# Patient Record
Sex: FEMALE | Race: WHITE | URBAN - METROPOLITAN AREA
[De-identification: names, ages, dates, MRNs, and addresses within clinical notes are randomized per-mention and may not be internally consistent; named-entity substitution may affect disease eponyms.]

---

## 2022-08-15 ENCOUNTER — OFFICE VISIT (OUTPATIENT)
Dept: PRIMARY CARE CLINIC | Age: 72
End: 2022-08-15
Payer: MEDICARE

## 2022-08-15 VITALS
OXYGEN SATURATION: 98 % | BODY MASS INDEX: 23.13 KG/M2 | DIASTOLIC BLOOD PRESSURE: 84 MMHG | RESPIRATION RATE: 16 BRPM | HEIGHT: 65 IN | WEIGHT: 138.8 LBS | SYSTOLIC BLOOD PRESSURE: 136 MMHG | HEART RATE: 93 BPM

## 2022-08-15 DIAGNOSIS — Z79.4 TYPE 2 DIABETES MELLITUS WITHOUT COMPLICATION, WITH LONG-TERM CURRENT USE OF INSULIN (HCC): Primary | ICD-10-CM

## 2022-08-15 DIAGNOSIS — E11.9 TYPE 2 DIABETES MELLITUS WITHOUT COMPLICATION, WITH LONG-TERM CURRENT USE OF INSULIN (HCC): Primary | ICD-10-CM

## 2022-08-15 DIAGNOSIS — R53.82 CHRONIC FATIGUE: ICD-10-CM

## 2022-08-15 DIAGNOSIS — E03.9 HYPOTHYROIDISM, UNSPECIFIED TYPE: ICD-10-CM

## 2022-08-15 DIAGNOSIS — E78.2 MIXED HYPERLIPIDEMIA: ICD-10-CM

## 2022-08-15 PROCEDURE — G8427 DOCREV CUR MEDS BY ELIG CLIN: HCPCS | Performed by: STUDENT IN AN ORGANIZED HEALTH CARE EDUCATION/TRAINING PROGRAM

## 2022-08-15 PROCEDURE — G8420 CALC BMI NORM PARAMETERS: HCPCS | Performed by: STUDENT IN AN ORGANIZED HEALTH CARE EDUCATION/TRAINING PROGRAM

## 2022-08-15 PROCEDURE — 99203 OFFICE O/P NEW LOW 30 MIN: CPT | Performed by: STUDENT IN AN ORGANIZED HEALTH CARE EDUCATION/TRAINING PROGRAM

## 2022-08-15 PROCEDURE — 1123F ACP DISCUSS/DSCN MKR DOCD: CPT | Performed by: STUDENT IN AN ORGANIZED HEALTH CARE EDUCATION/TRAINING PROGRAM

## 2022-08-15 PROCEDURE — 1036F TOBACCO NON-USER: CPT | Performed by: STUDENT IN AN ORGANIZED HEALTH CARE EDUCATION/TRAINING PROGRAM

## 2022-08-15 PROCEDURE — 2022F DILAT RTA XM EVC RTNOPTHY: CPT | Performed by: STUDENT IN AN ORGANIZED HEALTH CARE EDUCATION/TRAINING PROGRAM

## 2022-08-15 PROCEDURE — G8400 PT W/DXA NO RESULTS DOC: HCPCS | Performed by: STUDENT IN AN ORGANIZED HEALTH CARE EDUCATION/TRAINING PROGRAM

## 2022-08-15 PROCEDURE — 1090F PRES/ABSN URINE INCON ASSESS: CPT | Performed by: STUDENT IN AN ORGANIZED HEALTH CARE EDUCATION/TRAINING PROGRAM

## 2022-08-15 PROCEDURE — 3046F HEMOGLOBIN A1C LEVEL >9.0%: CPT | Performed by: STUDENT IN AN ORGANIZED HEALTH CARE EDUCATION/TRAINING PROGRAM

## 2022-08-15 PROCEDURE — 3017F COLORECTAL CA SCREEN DOC REV: CPT | Performed by: STUDENT IN AN ORGANIZED HEALTH CARE EDUCATION/TRAINING PROGRAM

## 2022-08-15 RX ORDER — INSULIN LISPRO 100 [IU]/ML
4 INJECTION, SOLUTION INTRAVENOUS; SUBCUTANEOUS 2 TIMES DAILY
COMMUNITY
Start: 2021-11-29

## 2022-08-15 RX ORDER — ASPIRIN 81 MG/1
81 TABLET ORAL DAILY
COMMUNITY

## 2022-08-15 RX ORDER — LANCETS
EACH MISCELLANEOUS
COMMUNITY
Start: 2022-08-02

## 2022-08-15 RX ORDER — LEVOTHYROXINE SODIUM 0.1 MG/1
TABLET ORAL
COMMUNITY
Start: 2021-08-26

## 2022-08-15 RX ORDER — PEN NEEDLE, DIABETIC 31 GX5/16"
NEEDLE, DISPOSABLE MISCELLANEOUS
COMMUNITY
Start: 2022-08-01

## 2022-08-15 RX ORDER — INSULIN DETEMIR 100 [IU]/ML
INJECTION, SOLUTION SUBCUTANEOUS
COMMUNITY
Start: 2022-02-07

## 2022-08-15 RX ORDER — ATORVASTATIN CALCIUM 20 MG/1
10 TABLET, FILM COATED ORAL DAILY
COMMUNITY
Start: 2021-10-19

## 2022-08-15 SDOH — ECONOMIC STABILITY: FOOD INSECURITY: WITHIN THE PAST 12 MONTHS, THE FOOD YOU BOUGHT JUST DIDN'T LAST AND YOU DIDN'T HAVE MONEY TO GET MORE.: NEVER TRUE

## 2022-08-15 SDOH — ECONOMIC STABILITY: FOOD INSECURITY: WITHIN THE PAST 12 MONTHS, YOU WORRIED THAT YOUR FOOD WOULD RUN OUT BEFORE YOU GOT MONEY TO BUY MORE.: NEVER TRUE

## 2022-08-15 ASSESSMENT — PATIENT HEALTH QUESTIONNAIRE - PHQ9
SUM OF ALL RESPONSES TO PHQ QUESTIONS 1-9: 0
1. LITTLE INTEREST OR PLEASURE IN DOING THINGS: 0
SUM OF ALL RESPONSES TO PHQ QUESTIONS 1-9: 0
SUM OF ALL RESPONSES TO PHQ QUESTIONS 1-9: 0
SUM OF ALL RESPONSES TO PHQ9 QUESTIONS 1 & 2: 0
2. FEELING DOWN, DEPRESSED OR HOPELESS: 0
SUM OF ALL RESPONSES TO PHQ QUESTIONS 1-9: 0

## 2022-08-15 ASSESSMENT — ENCOUNTER SYMPTOMS
COUGH: 0
ABDOMINAL PAIN: 0

## 2022-08-15 ASSESSMENT — SOCIAL DETERMINANTS OF HEALTH (SDOH): HOW HARD IS IT FOR YOU TO PAY FOR THE VERY BASICS LIKE FOOD, HOUSING, MEDICAL CARE, AND HEATING?: NOT HARD AT ALL

## 2022-08-15 NOTE — PROGRESS NOTES
[facility] PRIMARY CARE  [facility] [address]  [address] [address]  Dept: [phone]  Dept Fax: [phone]    Juliano Mitchell is a 81 y.o. female who presents today for her medical conditions/complaints as noted below. Chief Complaint   Patient presents with    [facility]    Health Maintenance     Completed in [place]        HPI:     81 y. o. F presented to office to [facility]. She denied any current concerns. She has history of diabetes mellitus type 2. Last HbA1c in March was 7.3 per patient. Requested per his records. Practices healthy lifestyle. Checks fasting blood sugar which is usually less than 100 per patient. Patient mentioned that if it is below 100 she started feeling jittery. Take Synthroid for hypothyroidism. No other current medical issues. No other current concerns per  Vital signs stable. Patient had mammogram in March this year which was normal.  She had colonoscopy done in 2016 in [place] which was normal per patient. Requested previous records. She also had DEXA scan done by previous provider which was normal per patient. She refuses to take vitamin D and calcium supplements as she gets stomach problems from that. Advised lifestyle changes. Medications reviewed and refilled as appropriate, problem list updated. All concerns discussed in details and all questions answered to patient satisfaction. No results found for: LABA1C          ( goal A1C is < 7)   No results found for: LABMICR  No results found for: LDLCHOLESTEROL, LDLCALC    (goal LDL is <100)   No results found for: AST, ALT, BUN, CR  BP Readings from Last 3 Encounters:   08/15/22 136/84          (goal 120/80)    Past Medical History:   Diagnosis Date    High cholesterol     Type 2 diabetes mellitus without complication ([facility] [address]       History reviewed. No pertinent surgical history.     Family History   Problem Relation Age of Onset Cancer Mother     Diabetes Maternal Grandmother        Social History     Tobacco Use    Smoking status: Never    Smokeless tobacco: Never   Substance Use Topics    Alcohol use: Yes     Comment: occasional      Current Outpatient Medications   Medication Sig Dispense Refill    atorvastatin (LIPITOR) 20 MG tablet Take 10 mg by mouth in the morning. insulin detemir (LEVEMIR FLEXTOUCH) 100 UNIT/ML injection pen INJECT 11 UNITS  SUBCUTANEOUSLY AT NIGHT      insulin lispro, 1 Unit Dial, 100 UNIT/ML SOPN Inject 4 Units into the skin in the morning and 4 Units before bedtime. levothyroxine (SYNTHROID) 100 MCG tablet TAKE 1 TABLET BY MOUTH  DAILY      B-D UF III MINI PEN NEEDLES 31G X 5 MM MISC USE TO INJECT INSULIN 3 TIMES A DAY AS DIRECTED      Accu-Chek FastClix Lancets MISC TEST 3 (THREE) TIMES A DAY. aspirin 81 MG EC tablet Take 81 mg by mouth in the morning. No current facility-administered medications for this visit. No Known Allergies    Health Maintenance   Topic Date Due    Annual Wellness Visit (AWV)  Never done    Lipids  Never done    DTaP/Tdap/Td vaccine (1 - Tdap) Never done    DEXA (modify frequency per FRAX score)  Never done    Pneumococcal 65+ years Vaccine (1 - PCV) Never done    Shingles vaccine (2 of 2) 01/29/2021    COVID-19 Vaccine (3 - Booster for Jae series) 05/10/2022    Hepatitis C screen  08/15/2023 (Originally 4/3/1968)    Flu vaccine (1) 09/01/2022    Breast cancer screen  02/28/2023    Depression Screen  08/15/2023    Colorectal Cancer Screen  08/10/2026    Hepatitis A vaccine  Aged Out    Hepatitis B vaccine  Aged Out    Hib vaccine  Aged Out    Meningococcal (ACWY) vaccine  Aged Out       Subjective:      Review of Systems   Constitutional:  Negative for fatigue and fever. HENT:  Negative for congestion. Respiratory:  Negative for cough. Cardiovascular:  Negative for chest pain and palpitations. Gastrointestinal:  Negative for abdominal pain. Genitourinary:  Negative for flank pain, frequency and hematuria. Musculoskeletal:  Negative for arthralgias. Skin:  Negative for rash. Allergic/Immunologic: Negative for environmental allergies. Neurological:  Negative for dizziness, speech difficulty and light-headedness. Hematological:  Negative for adenopathy. Psychiatric/Behavioral:  Negative for behavioral problems. The patient is not nervous/anxious. Objective:     Physical Exam  Constitutional:       General: She is not in acute distress. Appearance: Normal appearance. HENT:      Head: Normocephalic and atraumatic. Nose: No congestion. Cardiovascular:      Rate and Rhythm: Normal rate and regular rhythm. Pulses: Normal pulses. Heart sounds: Normal heart sounds. No murmur heard. Pulmonary:      Effort: Pulmonary effort is normal. No respiratory distress. Breath sounds: Normal breath sounds. No wheezing. Abdominal:      General: Bowel sounds are normal.      Palpations: Abdomen is soft. Tenderness: There is no abdominal tenderness. Musculoskeletal:         General: No swelling or tenderness. Normal range of motion. Cervical back: Normal range of motion and neck supple. No tenderness. Skin:     Coloration: Skin is not jaundiced. Findings: No bruising. Neurological:      General: No focal deficit present. Mental Status: She is alert and oriented to person, place, and time. Mental status is at baseline. Cranial Nerves: No cranial nerve deficit. Psychiatric:         Mood and Affect: Mood normal.         Behavior: Behavior normal.         Thought Content: Thought content normal.     /84   Pulse 93   Resp 16   Ht 5' 4.5\" (1.638 m)   Wt 138 lb 12.8 oz (63 kg)   SpO2 98%   BMI 23.46 kg/m²     Assessment:        Julia Dubois was seen today for establish care and health maintenance.     Diagnoses and all orders for this visit:    Type 2 diabetes mellitus without complication, with long-term current use of insulin (HCC)  -     Hemoglobin A1C; Future  Continued on the insulin. Chronic fatigue  -     Vitamin B12 & Folate; Future    Hypothyroidism, unspecified type  -     TSH With Reflex Ft4; Future  Continued on Synthroid. Mixed hyperlipidemia  Continued on Lipitor           Plan:      Return in about 3 months (around 11/15/2022). Orders Placed This Encounter   Procedures    Hemoglobin A1C     Standing Status:   Future     Standing Expiration Date:   2/15/2024    TSH With Reflex Ft4     Standing Status:   Future     Standing Expiration Date:   8/15/2023    Vitamin B12 & Folate     Standing Status:   Future     Standing Expiration Date:   8/15/2023     No orders of the defined types were placed in this encounter. Patient given educational materials - see patient instructions. Discussed use, benefit, and side effects of prescribedmedications. All patient questions answered. Pt voiced understanding. Reviewed health maintenance. Instructed to continue current medications, diet and exercise. Patient agreed with treatment plan. Follow up as directed. I spent a total of 30-44 minutes face to face with this patient. Over 50% of that time was spent on counseling and care coordination. Please see assessment and plan for details. Electronically signed by   Sarah aJcinto MD on 8/15/2022 at 9:27 AM  [facility]. Please note that this chart was generated using voice recognition Dragon dictation software. Although every effort was made to ensure the accuracy of this automatedtranscription, some errors in transcription may have occurred.

## 2022-08-17 ENCOUNTER — HOSPITAL ENCOUNTER (OUTPATIENT)
Age: 72
Setting detail: SPECIMEN
Discharge: HOME | End: 2022-08-17

## 2022-08-17 DIAGNOSIS — R53.82 CHRONIC FATIGUE: ICD-10-CM

## 2022-08-17 DIAGNOSIS — Z79.4 TYPE 2 DIABETES MELLITUS WITHOUT COMPLICATION, WITH LONG-TERM CURRENT USE OF INSULIN (HCC): ICD-10-CM

## 2022-08-17 DIAGNOSIS — E03.9 HYPOTHYROIDISM, UNSPECIFIED TYPE: ICD-10-CM

## 2022-08-17 DIAGNOSIS — E11.9 TYPE 2 DIABETES MELLITUS WITHOUT COMPLICATION, WITH LONG-TERM CURRENT USE OF INSULIN (HCC): ICD-10-CM

## 2022-08-17 LAB
ESTIMATED AVERAGE GLUCOSE: 163 MG/DL
FOLATE: 18.3 NG/ML
HBA1C MFR BLD: 7.3 % (ref 4–6)
TSH SERPL DL<=0.05 MIU/L-ACNC: 1.28 UIU/ML (ref 0.3–5)
VITAMIN B-12: 426 PG/ML (ref 232–1245)

## 2022-08-22 ENCOUNTER — TELEPHONE (OUTPATIENT)
Dept: FAMILY MEDICINE CLINIC | Age: 72
End: 2022-08-22

## 2022-10-21 NOTE — TELEPHONE ENCOUNTER
Patient calling in office for new prescriptions. Pharmacy states that PCP needs to send in new scripts since old scripts where from historical provider.  I have the medication pended with how patient states she takes the medication    Last Visit Date: 8/15/2022   Next Visit Date: 11/16/2022

## 2022-10-23 RX ORDER — ATORVASTATIN CALCIUM 20 MG/1
20 TABLET, FILM COATED ORAL DAILY
Qty: 90 TABLET | Refills: 0 | Status: SHIPPED | OUTPATIENT
Start: 2022-10-23 | End: 2023-01-21

## 2022-10-23 RX ORDER — LEVOTHYROXINE SODIUM 0.1 MG/1
100 TABLET ORAL DAILY
Qty: 90 TABLET | Refills: 3 | Status: SHIPPED | OUTPATIENT
Start: 2022-10-23

## 2022-10-31 NOTE — TELEPHONE ENCOUNTER
Pt called in today, she called in a medication refill last week. Medications were sent in to [facility] but pt states she received a email that the refills were cancelled by them due to not hearing back from the physicians office about a question on the medication. Did MA received any faxs from [facility]? Pt was advised the office would call to correct this for patient. Please return call to pt once this is corrected.

## 2022-10-31 NOTE — TELEPHONE ENCOUNTER
Called and spoke to pharmacist at [facility]. Gave clarification on script. Called and informed patient.

## 2022-11-16 ENCOUNTER — OFFICE VISIT (OUTPATIENT)
Dept: PRIMARY CARE CLINIC | Age: 72
End: 2022-11-16
Payer: MEDICARE

## 2022-11-16 VITALS
DIASTOLIC BLOOD PRESSURE: 70 MMHG | OXYGEN SATURATION: 98 % | HEART RATE: 78 BPM | HEIGHT: 64 IN | BODY MASS INDEX: 23.66 KG/M2 | SYSTOLIC BLOOD PRESSURE: 120 MMHG | WEIGHT: 138.6 LBS

## 2022-11-16 DIAGNOSIS — H61.22 IMPACTED CERUMEN OF LEFT EAR: ICD-10-CM

## 2022-11-16 DIAGNOSIS — Z79.4 TYPE 2 DIABETES MELLITUS WITHOUT COMPLICATION, WITH LONG-TERM CURRENT USE OF INSULIN (HCC): ICD-10-CM

## 2022-11-16 DIAGNOSIS — E11.9 TYPE 2 DIABETES MELLITUS WITHOUT COMPLICATION, WITH LONG-TERM CURRENT USE OF INSULIN (HCC): ICD-10-CM

## 2022-11-16 DIAGNOSIS — Z00.00 WELCOME TO MEDICARE PREVENTIVE VISIT: Primary | ICD-10-CM

## 2022-11-16 LAB — HBA1C MFR BLD: 6.9 %

## 2022-11-16 PROCEDURE — 3017F COLORECTAL CA SCREEN DOC REV: CPT | Performed by: STUDENT IN AN ORGANIZED HEALTH CARE EDUCATION/TRAINING PROGRAM

## 2022-11-16 PROCEDURE — 83036 HEMOGLOBIN GLYCOSYLATED A1C: CPT | Performed by: STUDENT IN AN ORGANIZED HEALTH CARE EDUCATION/TRAINING PROGRAM

## 2022-11-16 PROCEDURE — 3044F HG A1C LEVEL LT 7.0%: CPT | Performed by: STUDENT IN AN ORGANIZED HEALTH CARE EDUCATION/TRAINING PROGRAM

## 2022-11-16 PROCEDURE — 1123F ACP DISCUSS/DSCN MKR DOCD: CPT | Performed by: STUDENT IN AN ORGANIZED HEALTH CARE EDUCATION/TRAINING PROGRAM

## 2022-11-16 PROCEDURE — G0402 INITIAL PREVENTIVE EXAM: HCPCS | Performed by: STUDENT IN AN ORGANIZED HEALTH CARE EDUCATION/TRAINING PROGRAM

## 2022-11-16 RX ORDER — INSULIN DETEMIR 100 [IU]/ML
5 INJECTION, SOLUTION SUBCUTANEOUS NIGHTLY
Qty: 5 ADJUSTABLE DOSE PRE-FILLED PEN SYRINGE | Refills: 0 | Status: SHIPPED | OUTPATIENT
Start: 2022-11-16

## 2022-11-16 RX ORDER — INSULIN DETEMIR 100 [IU]/ML
5 INJECTION, SOLUTION SUBCUTANEOUS NIGHTLY
Qty: 5 ADJUSTABLE DOSE PRE-FILLED PEN SYRINGE | Refills: 0 | Status: CANCELLED | OUTPATIENT
Start: 2022-11-16

## 2022-11-16 RX ORDER — INSULIN DETEMIR 100 [IU]/ML
5 INJECTION, SOLUTION SUBCUTANEOUS NIGHTLY
Qty: 5 ADJUSTABLE DOSE PRE-FILLED PEN SYRINGE | Refills: 0 | Status: SHIPPED | OUTPATIENT
Start: 2022-11-16 | End: 2022-11-16

## 2022-11-16 ASSESSMENT — LIFESTYLE VARIABLES
HOW OFTEN DURING THE LAST YEAR HAVE YOU NEEDED AN ALCOHOLIC DRINK FIRST THING IN THE MORNING TO GET YOURSELF GOING AFTER A NIGHT OF HEAVY DRINKING: 0
HOW MANY STANDARD DRINKS CONTAINING ALCOHOL DO YOU HAVE ON A TYPICAL DAY: 1 OR 2
HOW OFTEN DURING THE LAST YEAR HAVE YOU FOUND THAT YOU WERE NOT ABLE TO STOP DRINKING ONCE YOU HAD STARTED: 0
HAVE YOU OR SOMEONE ELSE BEEN INJURED AS A RESULT OF YOUR DRINKING: 0
HOW OFTEN DURING THE LAST YEAR HAVE YOU HAD A FEELING OF GUILT OR REMORSE AFTER DRINKING: 0
HOW OFTEN DO YOU HAVE A DRINK CONTAINING ALCOHOL: 4 OR MORE TIMES A WEEK
HAS A RELATIVE, FRIEND, DOCTOR, OR ANOTHER HEALTH PROFESSIONAL EXPRESSED CONCERN ABOUT YOUR DRINKING OR SUGGESTED YOU CUT DOWN: 0
HOW OFTEN DURING THE LAST YEAR HAVE YOU BEEN UNABLE TO REMEMBER WHAT HAPPENED THE NIGHT BEFORE BECAUSE YOU HAD BEEN DRINKING: 0
HOW OFTEN DURING THE LAST YEAR HAVE YOU FAILED TO DO WHAT WAS NORMALLY EXPECTED FROM YOU BECAUSE OF DRINKING: 0

## 2022-11-16 ASSESSMENT — PATIENT HEALTH QUESTIONNAIRE - PHQ9
SUM OF ALL RESPONSES TO PHQ QUESTIONS 1-9: 0
SUM OF ALL RESPONSES TO PHQ9 QUESTIONS 1 & 2: 0
SUM OF ALL RESPONSES TO PHQ QUESTIONS 1-9: 0
SUM OF ALL RESPONSES TO PHQ QUESTIONS 1-9: 0
1. LITTLE INTEREST OR PLEASURE IN DOING THINGS: 0
2. FEELING DOWN, DEPRESSED OR HOPELESS: 0
SUM OF ALL RESPONSES TO PHQ QUESTIONS 1-9: 0

## 2022-11-16 NOTE — PATIENT INSTRUCTIONS
Personalized Preventive Plan for Yumiko Lamas - 11/16/2022  Medicare offers a range of preventive health benefits. Some of the tests and screenings are paid in full while other may be subject to a deductible, co-insurance, and/or copay. Some of these benefits include a comprehensive review of your medical history including lifestyle, illnesses that may run in your family, and various assessments and screenings as appropriate. After reviewing your medical record and screening and assessments performed today your provider may have ordered immunizations, labs, imaging, and/or referrals for you. A list of these orders (if applicable) as well as your Preventive Care list are included within your After Visit Summary for your review. Other Preventive Recommendations:    A preventive eye exam performed by an eye specialist is recommended every 1-2 years to screen for glaucoma; cataracts, macular degeneration, and other eye disorders. A preventive dental visit is recommended every 6 months. Try to get at least 150 minutes of exercise per week or 10,000 steps per day on a pedometer . Order or download the FREE \"Exercise & Physical Activity: Your Everyday Guide\" from [employer]. Call 5-504.156.7445 or search [employer] online. You need 2366-7977 mg of calcium and 0728-5054 IU of vitamin D per day. It is possible to meet your calcium requirement with diet alone, but a vitamin D supplement is usually necessary to meet this goal.  When exposed to the sun, use a sunscreen that protects against both UVA and UVB radiation with an SPF of 30 or greater. Reapply every 2 to 3 hours or after sweating, drying off with a towel, or swimming. Always wear a seat belt when traveling in a car. Always wear a helmet when riding a bicycle or motorcycle.

## 2022-11-16 NOTE — PROGRESS NOTES
Medicare Annual Wellness Visit    Ezekiel Mckeon is here for Medicare AWV, Diabetes, and Otalgia (Left ear pain)    Assessment & Plan   Type 2 diabetes mellitus without complication, with long-term current use of insulin ([facility])  -     POCT glycosylated hemoglobin (Hb A1C)  -      DIABETES FOOT EXAM  -     insulin detemir (LEVEMIR FLEXTOUCH) 100 UNIT/ML injection pen; Inject 5 Units into the skin nightly, Disp-5 Adjustable Dose Pre-filled Pen Syringe, R-0Normal  Advised to wean off insulin. Okay to continue short acting insulin as needed. Will advise liberal control due to episodes of hypoglycemia and age factor. .  Goal HbA1c 7-7.5. Recommended short frequent meals. Patient electing to take oral medications at this time. Welcome to Medicare preventive visit  Impacted cerumen of left ear  -     carbamide peroxide (DEBROX) 6.5 % otic solution; Place 5 drops in ear(s) 2 times daily, Disp-15 mL, R-0Normal    Recommendations for Preventive Services Due: see orders and patient instructions/AVS.  Recommended screening schedule for the next 5-10 years is provided to the patient in written form: see Patient Instructions/AVS.     Return in 3 months (on 2/16/2023) for Medicare Annual Wellness Visit in 1 year. Subjective   The following acute and/or chronic problems were also addressed today: Denied any acute concerns except intermittent episodes of mild earache on left side. Otoscopic evaluation showed impacted cerumen on left side due to which TM could not be visualized. She does have intermittent episodes of congestion. TM on right side normal.    Her HbA1c today was 6.9 which is better than before. She did mention having episodes of hypoglycemia on couple of occasions. Advised patient to cut down on insulin. Will advised to eventually stop long-acting insulin and continue short acting as needed.   She is reluctant to take oral medications and mentioned they did not suit her in the past when her previous brother tried it. Advised patient to wait tight blood glucose control to avoid episodes of hypoglycemia. Goal HbA1c 7-7.5. Patient's complete Health Risk Assessment and screening values have been reviewed and are found in Flowsheets. The following problems were reviewed today and where indicated follow up appointments were made and/or referrals ordered. Positive Risk Factor Screenings with Interventions:             General Health and ACP:  General  In general, how would you say your health is?: Good  In the past 7 days, have you experienced any of the following: New or Increased Pain, New or Increased Fatigue, Loneliness, Social Isolation, Stress or Anger?: No  Do you get the social and emotional support that you need?: Yes  Do you have a Living Will?: (!) No    Advance Directives       Power of  Living Will ACP-Advance Directive ACP-Power of     Not on File Not on File Not on File Not on File        General Health Risk Interventions:  Advised to have a living will              Objective   Vitals:    11/16/22 0823   BP: 120/70   Pulse: 78   SpO2: 98%   Weight: 138 lb 9.6 oz (62.9 kg)   Height: 5' 4\" (1.626 m)      Body mass index is 23.79 kg/m². General Appearance: alert and oriented to person, place and time, well developed and well- nourished, in no acute distress  Skin: warm and dry, no rash or erythema  Head: normocephalic and atraumatic  Eyes: pupils equal, round, and reactive to light  ENT: Right-sided tympanic membrane normal.    Left ear -impacted cerumen.   TM could not be visualized   Neck: supple and non-tender without mass  Pulmonary/Chest: clear to auscultation bilaterally- no wheezes  Cardiovascular: normal rate, regular rhythm, normal S1 and S2, no murmurs  Abdomen: soft, non-tender, non-distended  Extremities: no cyanosis, clubbing or edema  Musculoskeletal: normal range of motion  Neurologic:gait, coordination and speech normal       No Known Allergies  Prior to Visit Medications    Medication Sig Taking? Authorizing Provider   insulin detemir (LEVEMIR FLEXTOUCH) 100 UNIT/ML injection pen Inject 5 Units into the skin nightly Yes Estelita Krishnan MD   carbamide peroxide (DEBROX) 6.5 % otic solution Place 5 drops in ear(s) 2 times daily Yes Estelita Krishnan MD   levothyroxine (SYNTHROID) 100 MCG tablet Take 1 tablet by mouth daily Yes Estelita Krishnan MD   atorvastatin (LIPITOR) 20 MG tablet Take 10 mg by mouth in the morning. Yes Historical Provider, MD   insulin lispro, 1 Unit Dial, 100 UNIT/ML SOPN Inject 4 Units into the skin in the morning and 4 Units before bedtime. Yes Historical Provider, MD MOJICA UF III MINI PEN NEEDLES 31G X 5 MM MISC USE TO INJECT INSULIN 3 TIMES A DAY AS DIRECTED Yes Historical Provider, MD   Accu-Chek FastClix Lancets MISC TEST 3 (THREE) TIMES A DAY. Yes Historical Provider, MD   levothyroxine (SYNTHROID) 100 MCG tablet TAKE 1 TABLET BY MOUTH  DAILY Yes Historical Provider, MD   aspirin 81 MG EC tablet Take 81 mg by mouth in the morning. Yes Historical Provider, MD Liu (Including outside providers/suppliers regularly involved in providing care):   Patient Care Team:  Estelita Krishnan MD as PCP - General (Internal Medicine)  Estelita Krishnan MD as PCP [facility]     Reviewed and updated this visit:  Tobacco  Allergies  Meds  Problems  Med Hx  Surg Hx  Soc Hx  Fam Hx          . Estelita Krishnan MD  [address] [address]  [address] [address]

## 2023-01-03 ENCOUNTER — TELEPHONE (OUTPATIENT)
Dept: PRIMARY CARE CLINIC | Age: 73
End: 2023-01-03

## 2023-01-03 RX ORDER — ATORVASTATIN CALCIUM 20 MG/1
TABLET, FILM COATED ORAL
Qty: 90 TABLET | Refills: 3 | Status: SHIPPED | OUTPATIENT
Start: 2023-01-03

## 2023-01-03 NOTE — TELEPHONE ENCOUNTER
The patient called stating that she received the letter that her PCP is transitioning from the office. She states that she specifically wants an internal medicine physician that is an MD, not a PA or CNP. Writer advised her that the other physicians in the office that are accepting new patients are either a DO, CNP, or PA-C. The patient then inquired if she was to look for a physician that is the same as her current PCP, that it would be a MD internal medicine. Writer advised the patient that this was correct. The patient thanked the writer and ended the call.

## 2023-01-04 ENCOUNTER — TELEPHONE (OUTPATIENT)
Dept: PRIMARY CARE CLINIC | Age: 73
End: 2023-01-04

## 2023-01-04 NOTE — TELEPHONE ENCOUNTER
Spoke w pt and advised dr. Rob Medley not in this office, gave pt that office phone number, pt said ok.

## 2023-01-04 NOTE — TELEPHONE ENCOUNTER
----- Message from Ceferino Velasco sent at 1/3/2023  3:06 PM EST -----  Subject: Appointment Request    Reason for Call: New Patient/New to Provider Appointment needed: New   Patient Request Appointment    QUESTIONS    Reason for appointment request? No appointments available during search     Additional Information for Provider?  Patient would like to get established   with Dr. Thien Rahman if she is IM doctor  ---------------------------------------------------------------------------  --------------  [phone]  [phone]; OK to leave message on voicemail  ---------------------------------------------------------------------------  --------------  SCRIPT ANSWERS  COVID Screen: Rufina Cline

## 2023-01-09 DIAGNOSIS — E11.9 TYPE 2 DIABETES MELLITUS WITHOUT COMPLICATIONS (HCC): ICD-10-CM

## 2023-01-09 RX ORDER — PEN NEEDLE, DIABETIC 31 GX5/16"
NEEDLE, DISPOSABLE MISCELLANEOUS
Qty: 100 EACH | Refills: 1 | Status: SHIPPED | OUTPATIENT
Start: 2023-01-09

## 2023-02-13 ENCOUNTER — TELEPHONE (OUTPATIENT)
Dept: PRIMARY CARE CLINIC | Age: 73
End: 2023-02-13

## 2023-02-13 NOTE — TELEPHONE ENCOUNTER
Patient presented EOB to  staff. AWV from 11/2022 was being denied due to lifetime service benefit being met. AWV was billed as initial visit. Code changed to subsequent visit. Patient informed of resubmission.

## 2023-02-13 NOTE — TELEPHONE ENCOUNTER
Patient stopped in office with notification from Medicare that they have denied her November 2022 AWV. Writer consulted with , Adela Beard, and patient's billing record shows that visit was incorrectly billed as an initial AWV instead of a subsequent AWV. Charges were corrected and resubmitted. Patient was informed and expressed understanding.

## 2023-04-04 DIAGNOSIS — E11.9 TYPE 2 DIABETES MELLITUS WITHOUT COMPLICATIONS (HCC): ICD-10-CM

## 2023-04-04 RX ORDER — INSULIN LISPRO 100 [IU]/ML
4 INJECTION, SOLUTION INTRAVENOUS; SUBCUTANEOUS
Qty: 5 ADJUSTABLE DOSE PRE-FILLED PEN SYRINGE | Refills: 1 | Status: SHIPPED | OUTPATIENT
Start: 2023-04-04

## 2023-04-04 RX ORDER — FLURBIPROFEN SODIUM 0.3 MG/ML
SOLUTION/ DROPS OPHTHALMIC
Qty: 100 EACH | Refills: 1 | Status: SHIPPED | OUTPATIENT
Start: 2023-04-04

## 2023-04-04 NOTE — TELEPHONE ENCOUNTER
Last Visit Date: 11/16/2022   Next Visit Date: 6/22/2023 - Est w/ Dr. Keturah Tapia    Patient states that she needs a refill on her Humalog Kwikpen U-100/mL 5 by 3 mL. Patient injects 4 units twice a day, once in the morning and once in the evening before meals. Patient was getting medication from a different provider and didn't have Dr. Do Been fill the med yet. Patient states that she currently has 2 vials but is not sure if it will last her to her appt with Dr. Keturah Tapia.  Patient would like Humalog sent to [email or web address] mail service    Patient also requesting a refill on her B-D UF Mini Pen Needles 31G X 5 MM sent to [facility] pharmacy

## 2023-06-22 ENCOUNTER — OFFICE VISIT (OUTPATIENT)
Dept: PRIMARY CARE CLINIC | Age: 73
End: 2023-06-22
Payer: MEDICARE

## 2023-06-22 VITALS
DIASTOLIC BLOOD PRESSURE: 80 MMHG | WEIGHT: 141.2 LBS | BODY MASS INDEX: 24.24 KG/M2 | HEART RATE: 73 BPM | OXYGEN SATURATION: 97 % | SYSTOLIC BLOOD PRESSURE: 160 MMHG

## 2023-06-22 DIAGNOSIS — E78.2 MIXED HYPERLIPIDEMIA: ICD-10-CM

## 2023-06-22 DIAGNOSIS — Z79.4 TYPE 2 DIABETES MELLITUS WITHOUT COMPLICATION, WITH LONG-TERM CURRENT USE OF INSULIN (HCC): Primary | ICD-10-CM

## 2023-06-22 DIAGNOSIS — E03.9 HYPOTHYROIDISM, UNSPECIFIED TYPE: ICD-10-CM

## 2023-06-22 DIAGNOSIS — Z12.31 ENCOUNTER FOR SCREENING MAMMOGRAM FOR MALIGNANT NEOPLASM OF BREAST: ICD-10-CM

## 2023-06-22 DIAGNOSIS — R03.0 ELEVATED BP WITHOUT DIAGNOSIS OF HYPERTENSION: ICD-10-CM

## 2023-06-22 DIAGNOSIS — E11.9 TYPE 2 DIABETES MELLITUS WITHOUT COMPLICATION, WITH LONG-TERM CURRENT USE OF INSULIN (HCC): Primary | ICD-10-CM

## 2023-06-22 DIAGNOSIS — E55.9 VITAMIN D DEFICIENCY: ICD-10-CM

## 2023-06-22 LAB — HBA1C MFR BLD: 7.9 %

## 2023-06-22 PROCEDURE — G8420 CALC BMI NORM PARAMETERS: HCPCS | Performed by: FAMILY MEDICINE

## 2023-06-22 PROCEDURE — 1036F TOBACCO NON-USER: CPT | Performed by: FAMILY MEDICINE

## 2023-06-22 PROCEDURE — 3017F COLORECTAL CA SCREEN DOC REV: CPT | Performed by: FAMILY MEDICINE

## 2023-06-22 PROCEDURE — 3051F HG A1C>EQUAL 7.0%<8.0%: CPT | Performed by: FAMILY MEDICINE

## 2023-06-22 PROCEDURE — 2022F DILAT RTA XM EVC RTNOPTHY: CPT | Performed by: FAMILY MEDICINE

## 2023-06-22 PROCEDURE — G8400 PT W/DXA NO RESULTS DOC: HCPCS | Performed by: FAMILY MEDICINE

## 2023-06-22 PROCEDURE — G8427 DOCREV CUR MEDS BY ELIG CLIN: HCPCS | Performed by: FAMILY MEDICINE

## 2023-06-22 PROCEDURE — 1090F PRES/ABSN URINE INCON ASSESS: CPT | Performed by: FAMILY MEDICINE

## 2023-06-22 PROCEDURE — 83037 HB GLYCOSYLATED A1C HOME DEV: CPT | Performed by: FAMILY MEDICINE

## 2023-06-22 PROCEDURE — 1123F ACP DISCUSS/DSCN MKR DOCD: CPT | Performed by: FAMILY MEDICINE

## 2023-06-22 PROCEDURE — 99204 OFFICE O/P NEW MOD 45 MIN: CPT | Performed by: FAMILY MEDICINE

## 2023-06-22 RX ORDER — INSULIN LISPRO 100 [IU]/ML
5 INJECTION, SOLUTION INTRAVENOUS; SUBCUTANEOUS
Qty: 5 ADJUSTABLE DOSE PRE-FILLED PEN SYRINGE | Refills: 3
Start: 2023-06-22

## 2023-06-22 SDOH — ECONOMIC STABILITY: FOOD INSECURITY: WITHIN THE PAST 12 MONTHS, THE FOOD YOU BOUGHT JUST DIDN'T LAST AND YOU DIDN'T HAVE MONEY TO GET MORE.: NEVER TRUE

## 2023-06-22 SDOH — ECONOMIC STABILITY: INCOME INSECURITY: HOW HARD IS IT FOR YOU TO PAY FOR THE VERY BASICS LIKE FOOD, HOUSING, MEDICAL CARE, AND HEATING?: NOT HARD AT ALL

## 2023-06-22 SDOH — ECONOMIC STABILITY: HOUSING INSECURITY
IN THE LAST 12 MONTHS, WAS THERE A TIME WHEN YOU DID NOT HAVE A STEADY PLACE TO SLEEP OR SLEPT IN A SHELTER (INCLUDING NOW)?: NO

## 2023-06-22 SDOH — ECONOMIC STABILITY: FOOD INSECURITY: WITHIN THE PAST 12 MONTHS, YOU WORRIED THAT YOUR FOOD WOULD RUN OUT BEFORE YOU GOT MONEY TO BUY MORE.: NEVER TRUE

## 2023-06-22 ASSESSMENT — PATIENT HEALTH QUESTIONNAIRE - PHQ9
2. FEELING DOWN, DEPRESSED OR HOPELESS: 0
SUM OF ALL RESPONSES TO PHQ QUESTIONS 1-9: 0
1. LITTLE INTEREST OR PLEASURE IN DOING THINGS: 0
SUM OF ALL RESPONSES TO PHQ9 QUESTIONS 1 & 2: 0
SUM OF ALL RESPONSES TO PHQ QUESTIONS 1-9: 0

## 2023-06-22 ASSESSMENT — ENCOUNTER SYMPTOMS
SHORTNESS OF BREATH: 0
VOMITING: 0

## 2023-06-22 NOTE — PROGRESS NOTES
[facility] PRIMARY CARE  [address]   [address]  [address] [address]  Dept: [phone]  Dept Fax: [phone]    Michaela Khan is a 72 y.o. female who presents today for her medical conditions/complaints as noted below. Michaela Khan is c/o of  Chief Complaint   Patient presents with    Establish Care     Due for AKILAH         HPI:     HPI    Pt here to establish care with new pcp    She is on levemir and humalog. States her Levemir was decreased to 5 units by her previous PCP and she uses about 4 to 5 units with meals of Humalog. A1c did bump up to 7.9 so she will be using about 10 units of Levemir. Fasting sugars have been in the 150-160s range. Was on metformin in the past.     Blood pressure is elevated today. She declines blood pressure medication at this time. Hemoglobin A1C (%)   Date Value   06/22/2023 7.9   11/16/2022 6.9   08/17/2022 7.3 (H)             ( goal A1C is < 7)   No results found for: LABMICR  No results found for: LDLCHOLESTEROL, LDLCALC    (goal LDL is <100)   No results found for: AST, ALT, BUN, CR  BP Readings from Last 3 Encounters:   06/22/23 (!) 160/80   11/16/22 120/70   08/15/22 136/84          (goal 120/80)    Past Medical History:   Diagnosis Date    High cholesterol     Type 2 diabetes mellitus without complication ([facility] [address]       No past surgical history on file.     Family History   Problem Relation Age of Onset    Cancer Mother     Diabetes Maternal Grandmother        Social History     Tobacco Use    Smoking status: Never    Smokeless tobacco: Never   Substance Use Topics    Alcohol use: Yes     Comment: occasional      Current Outpatient Medications   Medication Sig Dispense Refill    insulin detemir (LEVEMIR FLEXTOUCH) 100 UNIT/ML injection pen Inject 10 Units into the skin nightly 5 Adjustable Dose Pre-filled Pen Syringe 0    insulin lispro, 1 Unit Dial, (HUMALOG KWIKPEN) 100 UNIT/ML SOPN Inject 5 Units into the skin 2 times

## 2023-06-27 ENCOUNTER — HOSPITAL ENCOUNTER (OUTPATIENT)
Age: 73
Setting detail: SPECIMEN
Discharge: HOME | End: 2023-06-27

## 2023-06-27 DIAGNOSIS — E11.9 TYPE 2 DIABETES MELLITUS WITHOUT COMPLICATION, WITH LONG-TERM CURRENT USE OF INSULIN (HCC): ICD-10-CM

## 2023-06-27 DIAGNOSIS — Z79.4 TYPE 2 DIABETES MELLITUS WITHOUT COMPLICATION, WITH LONG-TERM CURRENT USE OF INSULIN (HCC): ICD-10-CM

## 2023-06-27 DIAGNOSIS — E03.9 HYPOTHYROIDISM, UNSPECIFIED TYPE: ICD-10-CM

## 2023-06-27 DIAGNOSIS — E55.9 VITAMIN D DEFICIENCY: ICD-10-CM

## 2023-06-27 DIAGNOSIS — E78.2 MIXED HYPERLIPIDEMIA: ICD-10-CM

## 2023-06-27 LAB
25(OH)D3 SERPL-MCNC: 47.2 NG/ML
ALBUMIN SERPL-MCNC: 4.5 G/DL (ref 3.5–5.2)
ALBUMIN/GLOB SERPL: 1.8 {RATIO} (ref 1–2.5)
ALP SERPL-CCNC: 52 U/L (ref 35–104)
ALT SERPL-CCNC: 24 U/L (ref 5–33)
ANION GAP SERPL CALCULATED.3IONS-SCNC: 13 MMOL/L (ref 9–17)
AST SERPL-CCNC: 30 U/L
BASOPHILS # BLD: 0.04 K/UL (ref 0–0.2)
BASOPHILS NFR BLD: 1 % (ref 0–2)
BILIRUB SERPL-MCNC: 1.1 MG/DL (ref 0.3–1.2)
BUN SERPL-MCNC: 13 MG/DL (ref 8–23)
CALCIUM SERPL-MCNC: 10 MG/DL (ref 8.6–10.4)
CHLORIDE SERPL-SCNC: 98 MMOL/L (ref 98–107)
CHOLEST SERPL-MCNC: 219 MG/DL
CHOLESTEROL/HDL RATIO: 1.8
CO2 SERPL-SCNC: 24 MMOL/L (ref 20–31)
CREAT SERPL-MCNC: 0.57 MG/DL (ref 0.5–0.9)
CREAT UR-MCNC: 112.3 MG/DL (ref 28–217)
EOSINOPHIL # BLD: 0.1 K/UL (ref 0–0.44)
EOSINOPHILS RELATIVE PERCENT: 1 % (ref 1–4)
ERYTHROCYTE [DISTWIDTH] IN BLOOD BY AUTOMATED COUNT: 13.4 % (ref 11.8–14.4)
GFR SERPL CREATININE-BSD FRML MDRD: >60 ML/MIN/1.73M2
GLUCOSE SERPL-MCNC: 135 MG/DL (ref 70–99)
HCT VFR BLD AUTO: 39.2 % (ref 36.3–47.1)
HDLC SERPL-MCNC: 121 MG/DL
HGB BLD-MCNC: 12.7 G/DL (ref 11.9–15.1)
IMM GRANULOCYTES # BLD AUTO: <0.03 K/UL (ref 0–0.3)
IMM GRANULOCYTES NFR BLD: 0 %
LDLC SERPL CALC-MCNC: 86 MG/DL (ref 0–130)
LYMPHOCYTES # BLD: 21 % (ref 24–43)
LYMPHOCYTES NFR BLD: 1.58 K/UL (ref 1.1–3.7)
MCH RBC QN AUTO: 32.3 PG (ref 25.2–33.5)
MCHC RBC AUTO-ENTMCNC: 32.4 G/DL (ref 28.4–34.8)
MCV RBC AUTO: 99.7 FL (ref 82.6–102.9)
MICROALBUMIN UR-MCNC: 33 MG/L
MICROALBUMIN/CREAT UR-RTO: 29 MCG/MG CREAT
MONOCYTES NFR BLD: 0.53 K/UL (ref 0.1–1.2)
MONOCYTES NFR BLD: 7 % (ref 3–12)
NEUTROPHILS NFR BLD: 70 % (ref 36–65)
NEUTS SEG NFR BLD: 5.31 K/UL (ref 1.5–8.1)
NRBC BLD-RTO: 0 PER 100 WBC
PLATELET # BLD AUTO: 284 K/UL (ref 138–453)
PMV BLD AUTO: 10.4 FL (ref 8.1–13.5)
POTASSIUM SERPL-SCNC: 5.2 MMOL/L (ref 3.7–5.3)
PROT SERPL-MCNC: 7 G/DL (ref 6.4–8.3)
RBC # BLD AUTO: 3.93 M/UL (ref 3.95–5.11)
SODIUM SERPL-SCNC: 135 MMOL/L (ref 135–144)
TRIGL SERPL-MCNC: 62 MG/DL
TSH SERPL DL<=0.05 MIU/L-ACNC: 2.12 UIU/ML (ref 0.3–5)
WBC OTHER # BLD: 7.6 K/UL (ref 3.5–11.3)

## 2023-07-11 ENCOUNTER — APPOINTMENT (OUTPATIENT)
Dept: GENERAL RADIOLOGY | Age: 73
End: 2023-07-11
Payer: MEDICARE

## 2023-07-11 ENCOUNTER — TELEPHONE (OUTPATIENT)
Dept: PRIMARY CARE CLINIC | Age: 73
End: 2023-07-11

## 2023-07-11 ENCOUNTER — HOSPITAL ENCOUNTER (EMERGENCY)
Age: 73
Discharge: HOME | End: 2023-07-11
Attending: EMERGENCY MEDICINE
Payer: MEDICARE

## 2023-07-11 VITALS
BODY MASS INDEX: 23.69 KG/M2 | HEART RATE: 88 BPM | OXYGEN SATURATION: 100 % | DIASTOLIC BLOOD PRESSURE: 89 MMHG | SYSTOLIC BLOOD PRESSURE: 176 MMHG | TEMPERATURE: 97.7 F | RESPIRATION RATE: 12 BRPM | WEIGHT: 138 LBS

## 2023-07-11 DIAGNOSIS — S52.602A CLOSED FRACTURE OF DISTAL ENDS OF LEFT RADIUS AND ULNA, INITIAL ENCOUNTER: Primary | ICD-10-CM

## 2023-07-11 DIAGNOSIS — M25.532 LEFT WRIST PAIN: Primary | ICD-10-CM

## 2023-07-11 DIAGNOSIS — S52.502A CLOSED FRACTURE OF DISTAL ENDS OF LEFT RADIUS AND ULNA, INITIAL ENCOUNTER: Primary | ICD-10-CM

## 2023-07-11 PROCEDURE — 99283 EMERGENCY DEPT VISIT LOW MDM: CPT

## 2023-07-11 PROCEDURE — 6370000000 HC RX 637 (ALT 250 FOR IP)

## 2023-07-11 PROCEDURE — 73110 X-RAY EXAM OF WRIST: CPT

## 2023-07-11 PROCEDURE — 29125 APPL SHORT ARM SPLINT STATIC: CPT

## 2023-07-11 RX ORDER — MORPHINE SULFATE 4 MG/ML
4 INJECTION, SOLUTION INTRAMUSCULAR; INTRAVENOUS ONCE
Status: DISCONTINUED | OUTPATIENT
Start: 2023-07-11 | End: 2023-07-11

## 2023-07-11 RX ORDER — OXYCODONE HYDROCHLORIDE AND ACETAMINOPHEN 5; 325 MG/1; MG/1
1 TABLET ORAL ONCE
Status: COMPLETED | OUTPATIENT
Start: 2023-07-11 | End: 2023-07-11

## 2023-07-11 RX ORDER — OXYCODONE HYDROCHLORIDE 5 MG/1
5 TABLET ORAL EVERY 8 HOURS PRN
Qty: 12 TABLET | Refills: 0 | Status: SHIPPED | OUTPATIENT
Start: 2023-07-11 | End: 2023-07-15

## 2023-07-11 RX ADMIN — OXYCODONE HYDROCHLORIDE AND ACETAMINOPHEN 1 TABLET: 5; 325 TABLET ORAL at 14:10

## 2023-07-11 ASSESSMENT — PAIN DESCRIPTION - LOCATION: LOCATION: WRIST

## 2023-07-11 ASSESSMENT — ENCOUNTER SYMPTOMS: BACK PAIN: 0

## 2023-07-11 ASSESSMENT — PAIN DESCRIPTION - DESCRIPTORS: DESCRIPTORS: THROBBING

## 2023-07-11 ASSESSMENT — PAIN DESCRIPTION - ORIENTATION: ORIENTATION: LEFT

## 2023-07-11 ASSESSMENT — PAIN SCALES - GENERAL: PAINLEVEL_OUTOF10: 7

## 2023-07-11 NOTE — DISCHARGE INSTRUCTIONS
Please go to your scheduled appointment with orthopedics, Friday at 9:30 am.    Take your medication as indicated and prescribed. For pain use ibuprofen (Motrin / Advil) or acetaminophen (Tylenol), unless prescribed medications that have acetaminophen in it. You can take over the counter acetaminophen tablets (1 - 2 tablets of the 500-mg strength every 6 hours) or ibuprofen tablets (2 tablets every 4 hours). You can take oxycodone for breakthrough pain every 8 hours as needed. WARNING:  May cause drowsiness. May impair ability to operate vehicles or machinery. Do not use in combination with alcohol. Wear the splint at all times until you are seen by the orthopedic surgeon. Keep your hand elevated above your heart as much as possible to help reduce swelling. Keep splint clean and dry. You can use sling to support your arm, make sure to move your shoulder out of sling every hour while awake. PLEASE RETURN TO THE EMERGENCY DEPARTMENT IMMEDIATELY for worsening symptoms, pain not controlled with the prescribed / over the counter pain medication, numbness or tingling in your hands, unable to lift your wrist up, or if you develop any concerning symptoms such as: high fever not relieved by acetaminophen (Tylenol) and/or ibuprofen (Motrin / Advil), chills, shortness of breath, chest pain, feeling of your heart fluttering or racing, persistent nausea and/or vomiting, vomiting up blood, blood in your stool, numbness, loss of consciousness, weakness or tingling in the arms or legs or change in color of the extremities, changes in mental status, persistent headache, blurry vision, loss of bladder / bowel control, unable to follow up with your physician, or other any other care or concern.

## 2023-07-11 NOTE — TELEPHONE ENCOUNTER
Pt called into office, was unloading some boxes about an hour ago and had a fall, pt injured her left wrist. Pt states it is bruised, swollen & painful. Pt has had ice on it and is keeping it elevated. Pt calling into office for advisement, the walk in at the office is closed today, can PCP order an xray for pt to complete? Please advise.

## 2023-07-11 NOTE — TELEPHONE ENCOUNTER
Spoke to PCP, agreeable to ordering xray. Writer called pt to advised it would be ordered within the next hour, worsening sx pt should be seen.

## 2023-07-14 ENCOUNTER — OFFICE VISIT (OUTPATIENT)
Dept: ORTHOPEDIC SURGERY | Age: 73
End: 2023-07-14

## 2023-07-14 VITALS — RESPIRATION RATE: 14 BRPM | HEIGHT: 64 IN | WEIGHT: 138 LBS | BODY MASS INDEX: 23.56 KG/M2

## 2023-07-14 DIAGNOSIS — S52.615A CLOSED NONDISPLACED FRACTURE OF STYLOID PROCESS OF LEFT ULNA, INITIAL ENCOUNTER: ICD-10-CM

## 2023-07-14 DIAGNOSIS — S52.532A CLOSED COLLES' FRACTURE OF LEFT RADIUS, INITIAL ENCOUNTER: Primary | ICD-10-CM

## 2023-07-14 RX ORDER — OXYCODONE HYDROCHLORIDE AND ACETAMINOPHEN 5; 325 MG/1; MG/1
1 TABLET ORAL EVERY 6 HOURS PRN
Qty: 28 TABLET | Refills: 0 | Status: SHIPPED
Start: 2023-07-14 | End: 2023-07-14 | Stop reason: CLARIF

## 2023-07-14 RX ORDER — HYDROCODONE BITARTRATE AND ACETAMINOPHEN 5; 325 MG/1; MG/1
1 TABLET ORAL EVERY 6 HOURS PRN
Qty: 28 TABLET | Refills: 0 | Status: SHIPPED | OUTPATIENT
Start: 2023-07-14 | End: 2023-07-21

## 2023-07-14 NOTE — TELEPHONE ENCOUNTER
Yoel Vasquez from [employer] called in to advise they are current out of oxyCODONE-acetaminophen (PERCOCET) 5-325 MG per tablet and they are on back order, however she did say they did have [address] in stock.     Please send any new pain script to    [address] [place] [address], [address] [place] [address]         Please call Yoel Vasquez or [employer] with any questions @ [phone]

## 2023-07-14 NOTE — TELEPHONE ENCOUNTER
Latrice Tejada,    Please see message below. I discontinued script for percocet that you sent to pharmacy earlier and have pended a norco script for 28 tabs.  1 tab Q6H PRN

## 2023-07-14 NOTE — PROGRESS NOTES
arm elevated and iced for the next 24-48 hours.   4. Follow up will be in 1 weeks for cast check and reevaluation with wrist x-rays in cast.

## 2023-07-21 ENCOUNTER — OFFICE VISIT (OUTPATIENT)
Dept: ORTHOPEDIC SURGERY | Age: 73
End: 2023-07-21

## 2023-07-21 VITALS — WEIGHT: 138 LBS | BODY MASS INDEX: 23.56 KG/M2 | HEIGHT: 64 IN | RESPIRATION RATE: 14 BRPM

## 2023-07-21 DIAGNOSIS — S52.532D CLOSED COLLES' FRACTURE OF LEFT RADIUS WITH ROUTINE HEALING, SUBSEQUENT ENCOUNTER: Primary | ICD-10-CM

## 2023-07-21 PROCEDURE — 99024 POSTOP FOLLOW-UP VISIT: CPT | Performed by: PHYSICIAN ASSISTANT

## 2023-07-24 NOTE — PROGRESS NOTES
Subjective:       Neto Cantu is a 72 y.o. right hand-dominant female who returns for follow-up of a left distal radius and ulnar styloid fracture. The injury occurred 10 days ago. She reports no problems with the cast or injury, and no problems with swelling, numbness, or tingling in her left upper extremity. Patient's medications, allergies, past medical, surgical, social and family histories were reviewed and updated as appropriate. Objective:      General:   alert, appears stated age, and cooperative   Gait:    Normal   Left upper extremity  Cast Condition:  good   Circulation:   warm, well perfused, brisk capillary refill distal to the injury   Skin:  No evidence of erythema, ecchymosis, abrasions or lacerations a focal swelling noted to the digits distal to the cast   Swelling:  present   Deformity:  There is not an obvious deformity of the visualized forearm or fingers   Finger ROM:   normal   Sensation:   intact to light touch   Patient neurovascular intact distal to the cast.  Compartments are soft and present    Imaging  X-rays taken today on 7/20/2023 eval for independent review  3 views of the left wrist taken in cast demonstrate a intra-articular distal radius fracture with slight shortening compared with postreduction x-rays on 7/14/2023 but far proved from initial accident on 7/11/23. There is intra-articular stanchion with minimal step-off. Ulnar styloid fracture again visualized     Assessment:     Assessment Resolving fracture without complication. Encounter Diagnosis   Name Primary?     Closed Colles' fracture of left radius with routine healing, subsequent encounter Yes       Plan:      Cast left intact  Follow up will be in 2 weeks for cast check and reevaluation with xrays in cast.

## 2023-07-27 ENCOUNTER — HOSPITAL ENCOUNTER (OUTPATIENT)
Dept: MAMMOGRAPHY | Age: 73
Discharge: HOME | End: 2023-07-29
Payer: MEDICARE

## 2023-07-27 VITALS — BODY MASS INDEX: 23.9 KG/M2 | HEIGHT: 64 IN | WEIGHT: 140 LBS

## 2023-07-27 DIAGNOSIS — Z12.31 ENCOUNTER FOR SCREENING MAMMOGRAM FOR MALIGNANT NEOPLASM OF BREAST: ICD-10-CM

## 2023-07-27 PROCEDURE — 77063 BREAST TOMOSYNTHESIS BI: CPT

## 2023-08-04 ENCOUNTER — TELEPHONE (OUTPATIENT)
Dept: PRIMARY CARE CLINIC | Age: 73
End: 2023-08-04

## 2023-08-04 ENCOUNTER — OFFICE VISIT (OUTPATIENT)
Dept: ORTHOPEDIC SURGERY | Age: 73
End: 2023-08-04
Payer: MEDICARE

## 2023-08-04 VITALS — RESPIRATION RATE: 14 BRPM | BODY MASS INDEX: 23.9 KG/M2 | WEIGHT: 140 LBS | HEIGHT: 64 IN

## 2023-08-04 DIAGNOSIS — S52.532D CLOSED COLLES' FRACTURE OF LEFT RADIUS WITH ROUTINE HEALING, SUBSEQUENT ENCOUNTER: Primary | ICD-10-CM

## 2023-08-04 DIAGNOSIS — E11.9 TYPE 2 DIABETES MELLITUS WITHOUT COMPLICATION, WITH LONG-TERM CURRENT USE OF INSULIN (HCC): Primary | ICD-10-CM

## 2023-08-04 DIAGNOSIS — Z79.4 TYPE 2 DIABETES MELLITUS WITHOUT COMPLICATION, WITH LONG-TERM CURRENT USE OF INSULIN (HCC): Primary | ICD-10-CM

## 2023-08-04 PROCEDURE — G8420 CALC BMI NORM PARAMETERS: HCPCS | Performed by: PHYSICIAN ASSISTANT

## 2023-08-04 PROCEDURE — G8400 PT W/DXA NO RESULTS DOC: HCPCS | Performed by: PHYSICIAN ASSISTANT

## 2023-08-04 PROCEDURE — 1123F ACP DISCUSS/DSCN MKR DOCD: CPT | Performed by: PHYSICIAN ASSISTANT

## 2023-08-04 PROCEDURE — 3017F COLORECTAL CA SCREEN DOC REV: CPT | Performed by: PHYSICIAN ASSISTANT

## 2023-08-04 PROCEDURE — G8427 DOCREV CUR MEDS BY ELIG CLIN: HCPCS | Performed by: PHYSICIAN ASSISTANT

## 2023-08-04 PROCEDURE — 1090F PRES/ABSN URINE INCON ASSESS: CPT | Performed by: PHYSICIAN ASSISTANT

## 2023-08-04 PROCEDURE — 99212 OFFICE O/P EST SF 10 MIN: CPT | Performed by: PHYSICIAN ASSISTANT

## 2023-08-04 PROCEDURE — 1036F TOBACCO NON-USER: CPT | Performed by: PHYSICIAN ASSISTANT

## 2023-08-04 NOTE — TELEPHONE ENCOUNTER
Patient calling in office asking for new prescription onetouch verio test strips. Patient states she tests 3x daily. Writer does not see medication so unable to pend. Patient would like med to go to [facility] in [address] [place] [address] [address] [employer].

## 2023-08-04 NOTE — PROGRESS NOTES
reports she is tolerating the cast well however given her continued numbness a univalved to the cast is made and wrapped with an Ace wrap  Continuing cast and follow-up in 2 weeks for reevaluation  Follow-up in 2 weeks for cast removal and repeat x-rays.

## 2023-08-09 DIAGNOSIS — E11.9 TYPE 2 DIABETES MELLITUS WITHOUT COMPLICATION, WITH LONG-TERM CURRENT USE OF INSULIN (HCC): ICD-10-CM

## 2023-08-09 DIAGNOSIS — Z79.4 TYPE 2 DIABETES MELLITUS WITHOUT COMPLICATION, WITH LONG-TERM CURRENT USE OF INSULIN (HCC): ICD-10-CM

## 2023-08-09 NOTE — TELEPHONE ENCOUNTER
There is no option to put that specific brand on here. Could you please call and verbally tell them the brand. Thanks. I have written it multiple times and I am not sure why they are unable to see it.

## 2023-08-09 NOTE — TELEPHONE ENCOUNTER
Office received a fax from [facility] in [address] [place] [address] that office keeps sending one touch ultra test strips, pt needs the one touch leif test strips. Writer has discontinued the one touch ultra strips, please send one touch ultra strips to [facility] in [employer] in [place].

## 2023-08-16 DIAGNOSIS — Z79.4 TYPE 2 DIABETES MELLITUS WITHOUT COMPLICATION, WITH LONG-TERM CURRENT USE OF INSULIN (HCC): ICD-10-CM

## 2023-08-16 DIAGNOSIS — E11.9 TYPE 2 DIABETES MELLITUS WITHOUT COMPLICATION, WITH LONG-TERM CURRENT USE OF INSULIN (HCC): ICD-10-CM

## 2023-08-16 NOTE — TELEPHONE ENCOUNTER
Patient states that she discussed with Dr. Sravani Cleary to go to 10 units daily and would like medication increased. Patient is asking if medication can actually be increased to 11 units because sometimes she feels she needs it and doesn't want to run out. Pended medication, please check.   Last Visit Date: 6/22/2023   Next Visit Date: 9/25/2023

## 2023-08-18 ENCOUNTER — OFFICE VISIT (OUTPATIENT)
Dept: ORTHOPEDIC SURGERY | Age: 73
End: 2023-08-18

## 2023-08-18 VITALS — RESPIRATION RATE: 14 BRPM | HEIGHT: 64 IN | BODY MASS INDEX: 23.9 KG/M2 | WEIGHT: 140 LBS

## 2023-08-18 DIAGNOSIS — S52.532D CLOSED COLLES' FRACTURE OF LEFT RADIUS WITH ROUTINE HEALING, SUBSEQUENT ENCOUNTER: Primary | ICD-10-CM

## 2023-08-18 PROCEDURE — 99024 POSTOP FOLLOW-UP VISIT: CPT | Performed by: PHYSICIAN ASSISTANT

## 2023-08-21 NOTE — PROGRESS NOTES
Subjective:       Jessenia Cates is a 72 y.o. right hand-dominant female who returns for follow-up of a left distal radius and ulnar styloid fracture. The injury occurred 7/11/2023. Reduction and casting was performed at initial appointment me on 7/14/2023. She was having some numbness and tingling issues at last appointment so univalved was performed but otherwise patient tolerated the cast well. Upon cast removal patient notes some stiffness but overall feels that her pain is doing very well. She does continue to have some swelling of the wrist and digits but improved upon cast removal.    Patient's medications, allergies, past medical, surgical, social and family histories were reviewed and updated as appropriate. Objective:      General:   alert, appears stated age, and cooperative   Gait:    Normal   Left upper extremity      Circulation:   warm, well perfused, brisk capillary refill distal to the injury   Skin:  No evidence of erythema, ecchymosis, abrasions or lacerations a focal swelling noted to the digits distal to the cast   Swelling:  present   Deformity:  Focal swelling over distal radius but no obvious deformity noted   Finger ROM:   normal   Sensation:   intact to light touch     Wrist ROM:  - Flexion: 20  - Extension: 25  - UD: 15  - RD: 10    Imaging  X-rays taken today on 8/18/2023 eval for independent review  3 views of the left wrist taken in 3 views of the left wrist demonstrate an impacted, slightly comminuted fracture of the distal radius. On the lateral view minimal angulation noted. There does appear to be some slight shortening of the radius in comparison to the ulna but well within acceptable limits. Ulnar styloid avulsion fracture again noted. Assessment:     Assessment Resolving fracture without complication. Encounter Diagnosis   Name Primary?     Closed Colles' fracture of left radius with routine healing, subsequent encounter Yes       Plan:     Ms. Hue Hill is a

## 2023-08-30 ENCOUNTER — HOSPITAL ENCOUNTER (OUTPATIENT)
Dept: OCCUPATIONAL THERAPY | Facility: CLINIC | Age: 73
Setting detail: THERAPIES SERIES
Discharge: HOME | End: 2023-08-30
Payer: MEDICARE

## 2023-08-30 PROCEDURE — 97165 OT EVAL LOW COMPLEX 30 MIN: CPT

## 2023-08-30 PROCEDURE — 97110 THERAPEUTIC EXERCISES: CPT

## 2023-08-30 NOTE — CONSULTS
[] [facility] Out[facility]       Occupational Therapy 1st floor       [address] [facility]         Phone: [phone]       Fax: [phone] [x] [address] [facility] Occupational Therapy  [address]   [facility]  Phone: [phone]  Fax: [phone]       Occupational Therapy Hand & Upper Extremity  Initial Evaluation    Date: 2023      Patient: Citlali Bridges  : 1950  MRN: [id number]  Referring Provider:  LAURA Mariee  Insurance: Medicare  Medical Diagnosis: s52.53 left colles fracture   Rehab Codes: pain in hand M79.646,, pain in wrist M25.53,, stiffness in hand M25.64,, stiffness in wrist M25.63,, or muscle weakness generalized M62.81,  Onset Date: 2023    Next Dr. Azell Osler: 2023    Subjective:   CC: movement/ROM  HPI: Cabrera Brethade  Per Dr. Mehdi Raza visit 2023: Citlali Bridges is a 72 y.o. right hand-dominant female who returns for follow-up of a left distal radius and ulnar styloid fracture. The injury occurred 2023. Reduction and casting was performed at initial appointment me on 2023. She was having some numbness and tingling issues at last appointment so univalved was performed but otherwise patient tolerated the cast well. Upon cast removal patient notes some stiffness but overall feels that her pain is doing very well.   She does continue to have some swelling of the wrist and digits but improved upon cast removal.    Mechanism of Injury: fall  Surgery Date:  N/a Precautions: None    Involved Extremity: Left   Dominant Extremity: Right    Past Medical History: Diabetes          Comorbidities: NA    Medications: Refer to Medical chart in [facility] Allergies:  Refer to Medical chart in [facility]    Pain: Intensity:  0 /10 Location: NA     Pain Type:  Pain Altered Tx: no  Action Taken:none            Home Environment:    Pt lives in a  and House w/spouse With 2 DB  The washing machine is on and the main level    Vocation retired   Job

## 2023-09-01 ENCOUNTER — OFFICE VISIT (OUTPATIENT)
Dept: ORTHOPEDIC SURGERY | Age: 73
End: 2023-09-01
Payer: MEDICARE

## 2023-09-01 VITALS — WEIGHT: 140 LBS | BODY MASS INDEX: 23.9 KG/M2 | HEIGHT: 64 IN

## 2023-09-01 DIAGNOSIS — S52.532D CLOSED COLLES' FRACTURE OF LEFT RADIUS WITH ROUTINE HEALING, SUBSEQUENT ENCOUNTER: Primary | ICD-10-CM

## 2023-09-01 PROCEDURE — 1036F TOBACCO NON-USER: CPT | Performed by: PHYSICIAN ASSISTANT

## 2023-09-01 PROCEDURE — G8400 PT W/DXA NO RESULTS DOC: HCPCS | Performed by: PHYSICIAN ASSISTANT

## 2023-09-01 PROCEDURE — 1123F ACP DISCUSS/DSCN MKR DOCD: CPT | Performed by: PHYSICIAN ASSISTANT

## 2023-09-01 PROCEDURE — 3017F COLORECTAL CA SCREEN DOC REV: CPT | Performed by: PHYSICIAN ASSISTANT

## 2023-09-01 PROCEDURE — G8420 CALC BMI NORM PARAMETERS: HCPCS | Performed by: PHYSICIAN ASSISTANT

## 2023-09-01 PROCEDURE — 1090F PRES/ABSN URINE INCON ASSESS: CPT | Performed by: PHYSICIAN ASSISTANT

## 2023-09-01 PROCEDURE — G8427 DOCREV CUR MEDS BY ELIG CLIN: HCPCS | Performed by: PHYSICIAN ASSISTANT

## 2023-09-01 PROCEDURE — 99213 OFFICE O/P EST LOW 20 MIN: CPT | Performed by: PHYSICIAN ASSISTANT

## 2023-09-01 NOTE — PROGRESS NOTES
Subjective:       Nguyen Rosas is a 72 y.o. right hand-dominant female who returns for follow-up of a left distal radius and ulnar styloid fracture. The injury occurred 7/11/2023. Reduction and casting was performed at initial appointment me on 7/14/2023. Patient was in a cast until last appointment on 8/18/23 and tolerated well. Patient had expected stiffness and was started outpatient therapy at that time. He returns today stating that his swelling is actually quite a bit better he has had some very mild improvement in range of motion but is only with a 1 OT visit to this point. He has been doing some home exercise in the interim. .  She reports minimal pain but does note stiffness and weakness in this wrist and hand. Objective:      General:   alert, appears stated age, and cooperative   Gait:    Normal   Left upper extremity      Circulation:   warm, well perfused, brisk capillary refill distal to the injury   Skin:  No evidence of erythema, ecchymosis, abrasions or lacerations a focal swelling noted to the digits distal to the cast   Swelling:  Present- improved from last visit   Deformity:  Focal swelling over distal radius but no obvious deformity noted   Finger ROM:   normal   Sensation:   intact to light touch     Wrist ROM:  - Flexion: 20  - Extension: 25  - UD: 15  - RD: 10    Imaging  X-rays taken today on 9/1/2023 eval for independent review  3 views of the left wrist taken in 3 views of the left wrist demonstrate an impacted, slightly comminuted fracture of the distal radius. On the lateral view minimal angulation noted. There does appear to be some slight shortening of the radius in comparison to the ulna but well within acceptable limits. Increased bony callus noted. Ulnar styloid avulsion injury noted as well. Assessment:     Assessment Resolving fracture without complication. Encounter Diagnosis   Name Primary?     Closed Colles' fracture of left radius with routine healing,

## 2023-09-06 ENCOUNTER — HOSPITAL ENCOUNTER (OUTPATIENT)
Dept: OCCUPATIONAL THERAPY | Facility: CLINIC | Age: 73
Setting detail: THERAPIES SERIES
Discharge: HOME | End: 2023-09-06
Payer: MEDICARE

## 2023-09-06 PROCEDURE — 97140 MANUAL THERAPY 1/> REGIONS: CPT

## 2023-09-06 PROCEDURE — 97110 THERAPEUTIC EXERCISES: CPT

## 2023-09-06 NOTE — FLOWSHEET NOTE
Education:  [x] Verbalizes understanding. [x] Demonstrates understanding. [] Needs review. [] Demonstrates/verbalizes HEP/Ed previously given. Plan: [x] Continue current frequency toward short and long term goals. [x] Specific Instructions for subsequent treatments: continue with ROM/strength training   [] Other:       Time In: 0900  Time Out: 2183        Treatment Charges: Mins Units (BWC) Time In/Out:   []  Modalities:       []  Ultrasound      [x]  Ther Exercise 35 2    [x]  Manual Therapy 10 1    []  Ther Activities      []  Orthotic fit/train      []  Orthotic recheck      []  Other      Total Treatment time 45          Medicare Cap   [] Physical Therapy             [] Speech Therapy   [x] Occupational therapy  *PT and Speech caps combine                            $2230 Limit for PT and Speech combined  $2230 Limit for OT individually  At the beginning of the month where you expect to go over $2150, please add the [address] [place] [address] modifier       Patient Name: Aga Larson  YOB: 1950     Note:  This is an estimate of charges billed.                 Date of [facility] Name # units/ charge $$$ charge Daily Total Charge Ongoing Total $$$   08/30/2023 Low eval  TE 1  1 95.95  21.75 117.7 117.70    09/06/2023  manual  TE  1  2  25.94  28.13+21.75  75.72  193.42                                                                              Electronically signed by:  Roland Mccormack OT

## 2023-09-08 ENCOUNTER — APPOINTMENT (OUTPATIENT)
Dept: OCCUPATIONAL THERAPY | Facility: CLINIC | Age: 73
End: 2023-09-08
Payer: MEDICARE

## 2023-09-13 ENCOUNTER — HOSPITAL ENCOUNTER (OUTPATIENT)
Dept: OCCUPATIONAL THERAPY | Facility: CLINIC | Age: 73
Setting detail: THERAPIES SERIES
Discharge: HOME | End: 2023-09-13
Payer: MEDICARE

## 2023-09-13 PROCEDURE — 97140 MANUAL THERAPY 1/> REGIONS: CPT

## 2023-09-13 PROCEDURE — 97110 THERAPEUTIC EXERCISES: CPT

## 2023-09-13 NOTE — FLOWSHEET NOTE
[] [address] [facility]       Occupational Therapy            1st floor       [address] [facility]         Phone: [phone]       Fax: [phone] [x] [address] [facility] Occupational Therapy  [facility] [address] [facility]  Phone: [phone]  Fax: [phone]      Occupational Therapy Daily Treatment Note    Date:  2023  Patient Name:  Estuardo Hernandez    :  1950  MRN: [id number]  Referring Provider:  Other Lytle Goodell, PA  Insurance: Medicare  Medical Diagnosis: s52.53 left colles fracture           Rehab Codes: pain in hand M79.646,, pain in wrist M25.53,, stiffness in hand M25.64,, stiffness in wrist M25.63,, or muscle weakness generalized M62.81,  Onset Date: 2023                      Next Dr. Venice Mckeonose: 2023  Visit# / total visits: 3/20; Progress note for Medicare patient due at visit 10    Cancels/No Shows: 0/0      Subjective:    Pain:  [] Yes  [x] No Location: Pain Rating: (0-10 scale) 0/10  Pain altered Tx:  [] No  [] Yes  Action:  Pt Comments: increased ROM, less pain, overdid it in yard yesterday and had some wrist pain overnight, but is gone now. Precautions: allow patient to lift up to 10 pounds with therapy supervision but gradually increasing her weight tolerance as she has been on 1 pound weight restriction recently. Per visit 2023      Objective: Today's Treatment:  Modalities: hot pack on hand/wrist at beginningof therapy session x10 minutes  Exercise Flow Sheet:  Exercise Reps/Time Weight/Level Comments   Wrist flex/ext/RD/UD/ 10x3 1/2# -   Pron/sup 10x3 1# X   Composite fist 10x3 AROM X   Claw fist 10x3 AROM X   Putty squeezes   if cleared for strengthening 2-3 minutes tan HEP    p/u cubes and transfer 1 series   AROM X    Paper crumples x3 AROM HEP   Web, push, squeeze 10x2 yellow X   Wrist maze 2x AROM X   clips 1 series Yellow-green X   Other: Access Code [email or web address]  Wrist and hand ROM  2023  MAREK Tipton     Specific Instructions for

## 2023-09-15 ENCOUNTER — HOSPITAL ENCOUNTER (OUTPATIENT)
Dept: OCCUPATIONAL THERAPY | Facility: CLINIC | Age: 73
Setting detail: THERAPIES SERIES
Discharge: HOME | End: 2023-09-15
Payer: MEDICARE

## 2023-09-15 PROCEDURE — 97110 THERAPEUTIC EXERCISES: CPT

## 2023-09-15 PROCEDURE — 97140 MANUAL THERAPY 1/> REGIONS: CPT

## 2023-09-15 NOTE — FLOWSHEET NOTE
understanding. [x] Demonstrates understanding. [] Needs review. [] Demonstrates/verbalizes HEP/Ed previously given. Plan: [x] Continue current frequency toward short and long term goals. [x] Specific Instructions for subsequent treatments: continue with ROM/strength training   [] Other:       Time In: 0900  Time Out: 1500        Treatment Charges: Mins Units (BWC) Time In/Out:   []  Modalities:       []  Ultrasound      [x]  Ther Exercise 35 2    [x]  Manual Therapy 15 1    []  Ther Activities      []  Orthotic fit/train      []  Orthotic recheck      []  Other      Total Treatment time 50          Medicare Cap   [] Physical Therapy             [] Speech Therapy   [x] Occupational therapy  *PT and Speech caps combine                            $2230 Limit for PT and Speech combined  $2230 Limit for OT individually  At the beginning of the month where you expect to go over $2150, please add the [address] [place] [address] modifier       Patient Name: Aline Tovar  YOB: 1950     Note:  This is an estimate of charges billed.                 Date of [facility] Name # units/ charge $$$ charge Daily Total Charge Ongoing Total $$$   08/30/2023 Low eval  TE 1  1 95.95  21.75 117.7 117.70    09/06/2023  manual  TE  1  2  25.94  28.13+21.75  75.72  193.42    09/13/2023  Manual  TE  1  2  25.94+28.13+21.75  75.72  269.14    09/15/2023  Manual  TE  1  2  25.94+28.13+21.75  75.72  344.86                                                  Electronically signed by:  Joellen Denise OT

## 2023-09-19 RX ORDER — LEVOTHYROXINE SODIUM 0.1 MG/1
100 TABLET ORAL DAILY
Qty: 90 TABLET | Refills: 3 | Status: SHIPPED | OUTPATIENT
Start: 2023-09-19

## 2023-09-20 ENCOUNTER — HOSPITAL ENCOUNTER (OUTPATIENT)
Dept: OCCUPATIONAL THERAPY | Facility: CLINIC | Age: 73
Setting detail: THERAPIES SERIES
Discharge: HOME | End: 2023-09-20
Payer: MEDICARE

## 2023-09-20 PROCEDURE — 97110 THERAPEUTIC EXERCISES: CPT

## 2023-09-20 PROCEDURE — 97140 MANUAL THERAPY 1/> REGIONS: CPT

## 2023-09-22 ENCOUNTER — HOSPITAL ENCOUNTER (OUTPATIENT)
Dept: OCCUPATIONAL THERAPY | Facility: CLINIC | Age: 73
Setting detail: THERAPIES SERIES
Discharge: HOME | End: 2023-09-22
Payer: MEDICARE

## 2023-09-22 PROCEDURE — 97140 MANUAL THERAPY 1/> REGIONS: CPT

## 2023-09-22 PROCEDURE — 97110 THERAPEUTIC EXERCISES: CPT

## 2023-09-22 NOTE — FLOWSHEET NOTE
[] [address] [facility]       Occupational Therapy            1st floor       [address] [facility]         Phone: [phone]       Fax: [phone] [x] [facility] Occupational Therapy  [address] [facility]  Phone: [phone]  Fax: [phone]      Occupational Therapy Daily Treatment Note    Date:  2023  Patient Name:  Sammy Bedolla    :  1950  MRN: [id number]  Referring Provider:  LAURA Magaña  Insurance: Medicare  Medical Diagnosis: s52.53 left colles fracture           Rehab Codes: pain in hand M79.646,, pain in wrist M25.53,, stiffness in hand M25.64,, stiffness in wrist M25.63,, or muscle weakness generalized M62.81,  Onset Date: 2023                      Next Dr. Ronak Matta: 2023  Visit# / total visits: ; Progress note for Medicare patient due at visit 10    Cancels/No Shows: 0/0      Subjective:    Pain:  [x] Yes  [] No Location: left wrist/hand Pain Rating: (0-10 scale) 1/10  Pain altered Tx:  [] No  [x] Yes  Action: hot pack x10 minutes at beginning of session  Pt Comments: Was more sore yesterday, I did more activity in the evening. Wore brace at night and it feels better this am.    Precautions: allow patient to lift up to 10 pounds with therapy supervision but gradually increasing her weight tolerance as she has been on 1 pound weight restriction recently.  Per visit 2023      Objective:    Edema    Right Left Left  2023   thumb 6.0 7.0 6.5   index 6.0 7.0 6.4   middle 5.5 6.4 6.0   ring 5.3 6.2 5.5   pinky 5.0 6.1 5.4   palm 19.0 19.4 18.7   Wrist joint 15.7 17.4 17.5                              UE ROM/MMT    Right Left MMT Right MMT Left Left ROM  2023 Left MMT  2023   Shoulder             Flexion WFL WFL         Extension WFL WFL         Adduction [facility]         Internal Rotation [facility] WFL         External Rotation WFL WFL         Elbow             Flexion WFL WFL         Extension [facility]

## 2023-09-25 ENCOUNTER — TELEPHONE (OUTPATIENT)
Dept: PRIMARY CARE CLINIC | Age: 73
End: 2023-09-25

## 2023-09-25 ENCOUNTER — OFFICE VISIT (OUTPATIENT)
Dept: PRIMARY CARE CLINIC | Age: 73
End: 2023-09-25
Payer: MEDICARE

## 2023-09-25 VITALS
OXYGEN SATURATION: 100 % | WEIGHT: 138 LBS | DIASTOLIC BLOOD PRESSURE: 82 MMHG | SYSTOLIC BLOOD PRESSURE: 136 MMHG | BODY MASS INDEX: 23.69 KG/M2 | HEART RATE: 76 BPM

## 2023-09-25 DIAGNOSIS — E03.9 HYPOTHYROIDISM, UNSPECIFIED TYPE: ICD-10-CM

## 2023-09-25 DIAGNOSIS — E78.2 MIXED HYPERLIPIDEMIA: ICD-10-CM

## 2023-09-25 DIAGNOSIS — R80.9 MICROALBUMINURIA: ICD-10-CM

## 2023-09-25 DIAGNOSIS — Z79.4 TYPE 2 DIABETES MELLITUS WITHOUT COMPLICATION, WITH LONG-TERM CURRENT USE OF INSULIN (HCC): Primary | ICD-10-CM

## 2023-09-25 DIAGNOSIS — E11.9 TYPE 2 DIABETES MELLITUS WITHOUT COMPLICATION, WITH LONG-TERM CURRENT USE OF INSULIN (HCC): Primary | ICD-10-CM

## 2023-09-25 LAB — HBA1C MFR BLD: 8.2 %

## 2023-09-25 PROCEDURE — 1090F PRES/ABSN URINE INCON ASSESS: CPT | Performed by: FAMILY MEDICINE

## 2023-09-25 PROCEDURE — 1036F TOBACCO NON-USER: CPT | Performed by: FAMILY MEDICINE

## 2023-09-25 PROCEDURE — G8427 DOCREV CUR MEDS BY ELIG CLIN: HCPCS | Performed by: FAMILY MEDICINE

## 2023-09-25 PROCEDURE — 3052F HG A1C>EQUAL 8.0%<EQUAL 9.0%: CPT | Performed by: FAMILY MEDICINE

## 2023-09-25 PROCEDURE — G8400 PT W/DXA NO RESULTS DOC: HCPCS | Performed by: FAMILY MEDICINE

## 2023-09-25 PROCEDURE — 99214 OFFICE O/P EST MOD 30 MIN: CPT | Performed by: FAMILY MEDICINE

## 2023-09-25 PROCEDURE — 1123F ACP DISCUSS/DSCN MKR DOCD: CPT | Performed by: FAMILY MEDICINE

## 2023-09-25 PROCEDURE — G8420 CALC BMI NORM PARAMETERS: HCPCS | Performed by: FAMILY MEDICINE

## 2023-09-25 PROCEDURE — 83036 HEMOGLOBIN GLYCOSYLATED A1C: CPT | Performed by: FAMILY MEDICINE

## 2023-09-25 PROCEDURE — 3017F COLORECTAL CA SCREEN DOC REV: CPT | Performed by: FAMILY MEDICINE

## 2023-09-25 PROCEDURE — 2022F DILAT RTA XM EVC RTNOPTHY: CPT | Performed by: FAMILY MEDICINE

## 2023-09-25 ASSESSMENT — ENCOUNTER SYMPTOMS
SHORTNESS OF BREATH: 0
VOMITING: 0

## 2023-09-25 NOTE — PROGRESS NOTES
[address]  PRIMARY CARE  [address]  [address] [facility] [address]  [place] [address]  Dept: [phone]  Dept Fax: [phone]    Chante Gonzalez is a 72 y.o. female who presents today for her medical conditions/complaints as noted below. Chante Gonzalez is c/o of  Chief Complaint   Patient presents with    Diabetes         HPI:     HPI      Pt here for follow up on chronic conditions      A1c is up to 8.2. States her sugars have been a little bit more elevated but slowly improving. She is on Levemir 11 units nightly and Humalog 5 units twice a day with meals      Fell few months ago and fractured left arm-patient had Colles' fracture and did not require surgery. Doing well with PT . Was on metformin and januvia in the past-did not work well for her. We also discussed that she had microalbuminuria and I recommend low-dose lisinopril 2.5 mg. She would like to hold off on this for now. Hemoglobin A1C (%)   Date Value   09/25/2023 8.2   06/22/2023 7.9   11/16/2022 6.9             ( goal A1C is < 7)   No components found for: \"LABMICR\"  LDL Cholesterol (mg/dL)   Date Value   06/27/2023 86       (goal LDL is <100)   AST (U/L)   Date Value   06/27/2023 30     ALT (U/L)   Date Value   06/27/2023 24     BUN (mg/dL)   Date Value   06/27/2023 13     BP Readings from Last 3 Encounters:   09/25/23 136/82   07/11/23 (!) 176/89   06/22/23 (!) 160/80          (goal 120/80)    Past Medical History:   Diagnosis Date    High cholesterol     Type 2 diabetes mellitus without complication (HCC)       No past surgical history on file.     Family History   Problem Relation Age of Onset    Cancer Mother     Diabetes Maternal Grandmother        Social History     Tobacco Use    Smoking status: Never    Smokeless tobacco: Never   Substance Use Topics    Alcohol use: Yes     Comment: occasional      Current Outpatient Medications   Medication Sig Dispense Refill    levothyroxine (SYNTHROID) 100 MCG

## 2023-09-27 ENCOUNTER — HOSPITAL ENCOUNTER (OUTPATIENT)
Dept: OCCUPATIONAL THERAPY | Facility: CLINIC | Age: 73
Setting detail: THERAPIES SERIES
Discharge: HOME | End: 2023-09-27
Payer: MEDICARE

## 2023-09-27 DIAGNOSIS — Z79.4 TYPE 2 DIABETES MELLITUS WITHOUT COMPLICATION, WITH LONG-TERM CURRENT USE OF INSULIN (HCC): ICD-10-CM

## 2023-09-27 DIAGNOSIS — E11.9 TYPE 2 DIABETES MELLITUS WITHOUT COMPLICATION, WITH LONG-TERM CURRENT USE OF INSULIN (HCC): ICD-10-CM

## 2023-09-27 PROCEDURE — 97110 THERAPEUTIC EXERCISES: CPT

## 2023-09-27 PROCEDURE — 97140 MANUAL THERAPY 1/> REGIONS: CPT

## 2023-09-27 RX ORDER — INSULIN LISPRO 100 [IU]/ML
5 INJECTION, SOLUTION INTRAVENOUS; SUBCUTANEOUS
Qty: 5 ADJUSTABLE DOSE PRE-FILLED PEN SYRINGE | Refills: 3 | Status: SHIPPED | OUTPATIENT
Start: 2023-09-27

## 2023-09-27 NOTE — FLOWSHEET NOTE
09/15/2023  Manual  TE  1  2  25.94+28.13+21.75  75.72  344.86    09/20/2023  Manual  TE  1  3  25.94+28.13+43.50  97.57  442.43    09/22/2023  Manual  TE  1  2  25.94+28.13+21.75  75.72  518.15   09/27/23 Manual  TE  1  3 23.13 (1)  19.62 (3) 81.99 600.14          Electronically signed by:  CIARA Don/MICHAEL

## 2023-09-29 ENCOUNTER — HOSPITAL ENCOUNTER (OUTPATIENT)
Dept: OCCUPATIONAL THERAPY | Facility: CLINIC | Age: 73
Setting detail: THERAPIES SERIES
Discharge: HOME | End: 2023-09-29
Payer: MEDICARE

## 2023-09-29 ENCOUNTER — OFFICE VISIT (OUTPATIENT)
Dept: ORTHOPEDIC SURGERY | Age: 73
End: 2023-09-29
Payer: MEDICARE

## 2023-09-29 VITALS — WEIGHT: 138 LBS | RESPIRATION RATE: 14 BRPM | BODY MASS INDEX: 23.56 KG/M2 | HEIGHT: 64 IN

## 2023-09-29 DIAGNOSIS — S52.532D CLOSED COLLES' FRACTURE OF LEFT RADIUS WITH ROUTINE HEALING, SUBSEQUENT ENCOUNTER: Primary | ICD-10-CM

## 2023-09-29 PROCEDURE — 1123F ACP DISCUSS/DSCN MKR DOCD: CPT | Performed by: PHYSICIAN ASSISTANT

## 2023-09-29 PROCEDURE — 97110 THERAPEUTIC EXERCISES: CPT

## 2023-09-29 PROCEDURE — 97140 MANUAL THERAPY 1/> REGIONS: CPT

## 2023-09-29 PROCEDURE — 1090F PRES/ABSN URINE INCON ASSESS: CPT | Performed by: PHYSICIAN ASSISTANT

## 2023-09-29 PROCEDURE — 1036F TOBACCO NON-USER: CPT | Performed by: PHYSICIAN ASSISTANT

## 2023-09-29 PROCEDURE — 99212 OFFICE O/P EST SF 10 MIN: CPT | Performed by: PHYSICIAN ASSISTANT

## 2023-09-29 PROCEDURE — G8427 DOCREV CUR MEDS BY ELIG CLIN: HCPCS | Performed by: PHYSICIAN ASSISTANT

## 2023-09-29 PROCEDURE — G8420 CALC BMI NORM PARAMETERS: HCPCS | Performed by: PHYSICIAN ASSISTANT

## 2023-09-29 PROCEDURE — 3017F COLORECTAL CA SCREEN DOC REV: CPT | Performed by: PHYSICIAN ASSISTANT

## 2023-09-29 PROCEDURE — G8400 PT W/DXA NO RESULTS DOC: HCPCS | Performed by: PHYSICIAN ASSISTANT

## 2023-09-29 NOTE — PROGRESS NOTES
[] [address] [facility]  [address] [facility].  [phone]  F: [phone] [] [address]  [address]   [address]  P: [phone]  F: [phone] [x] Yashira Nguyen  P: [phone]  F: [phone] [] [facility].   P: [phone]  F: [phone] [] [address]  [address]  [place]: [phone]   F: [phone]     Occupational Therapy Progress Note    Date: 2023      Patient: Shaggy Felix  : 1950  MRN: [id number]    Referring Provider:  LAURA Rojas  Insurance: Medicare  Medical Diagnosis: s52.53 left colles fracture           Rehab Codes: pain in hand M79.646,, pain in wrist M25.53,, stiffness in hand M25.64,, stiffness in wrist M25.63,, or muscle weakness generalized M62.81,  Onset Date: 2023                      Next Dr. Girard Ebbs: 2023  Visit# / total visits: ; Progress note for Medicare patient due at visit 10                        Cancels/No Shows: 0/0  Total visits attended: 7  Date range of services: 23 to 23    Subjective:  Pain:  [x] Yes  [] No               Location: Ulnar aspect of the L wrist              Pain Rating: (0-10 scale) 2/10  Pain altered Tx:  [x] No  [] Yes  Action:  Comments: Pt report she is still having ulnar sided wrist pn. Pt reports that she is getting better everyday :    Objective:  Tests/Measurements: Upper Extremity Functional Index  Current Functional Level:  66/80 functionally impaired as measured with the Upper Extremity Functional Index Survey.   0-80 scale, with 80 = no Deficits  (The UEFI model does not provide any specific cut off points that could classify the upper limb disability degree, however, a minimal

## 2023-09-29 NOTE — PROGRESS NOTES
Subjective:       Megan Macdonald is a 72 y.o. right hand-dominant female who returns for follow-up of a left distal radius and ulnar styloid fracture. The injury occurred 7/11/2023. Reduction and casting was performed at initial appointment me on 7/14/2023. Patient was in a cast until last appointment on 8/18/23 and tolerated well. Patient has been in OT for last 6 weeks and can get improvement. States overall she has minimal pain at rest but does note occasional pain over the ulnar wrist with certain movements as well as continued swelling in this area. She denies any new injury or trauma overhead with improved this point which has had 3 visits of OT left at this juncture     Objective:      General:   alert, appears stated age, and cooperative   Gait:    Normal   Left upper extremity      Circulation:   warm, well perfused, brisk capillary refill distal to the injury   Skin:  No evidence of erythema, ecchymosis, abrasions or lacerations a focal swelling noted to the digits distal to the cast   Swelling:  Present- improved from last visit   Deformity:  Focal swelling over TFCC area   Finger ROM:   normal   Sensation:   intact to light touch     Wrist ROM:  - Flexion: 25  - Extension: 45  - UD: 15  - RD: 10    Imaging  X-rays taken today on 9/1/2023 eval for independent review  3 views of the left wrist taken in 3 views of the left wrist demonstrate an impacted, slightly comminuted fracture of the distal radius. On the lateral view minimal angulation noted. There does appear to be some slight shortening of the radius in comparison to the ulna but well within acceptable limits. Increased bony callus noted. Ulnar styloid avulsion injury noted as well. Assessment:     Assessment Resolving fracture without complication. Encounter Diagnosis   Name Primary?     Closed Colles' fracture of left radius with routine healing, subsequent encounter Yes       Plan:     Ms. Oliver Gonzalez is a 68-year-old female who

## 2023-09-29 NOTE — FLOWSHEET NOTE
[] 1200 [facility]       Occupational Therapy            1st floor       [address], [place]         Phone: [phone]       Fax: [phone] [x] 803 [facility] Occupational Therapy  [address] [place], [place]  Phone: [phone]  Fax: [phone]     Occupational Therapy Daily Treatment Note    Date:  2023  Patient Name:  Luis Manuel Lester    :  1950  MRN: [id number]  Referring Provider:  Other Reyes Bars, PA  Insurance: Medicare  Medical Diagnosis: s52.53 left colles fracture           Rehab Codes: pain in hand M79.646,, pain in wrist M25.53,, stiffness in hand M25.64,, stiffness in wrist M25.63,, or muscle weakness generalized M62.81,  Onset Date: 2023                      Next Dr. Davison Eans: 2023  Visit# / total visits: ; Progress note for Medicare patient due at visit 10    Cancels/No Shows: 0/0      Subjective:    Pain:  [] Yes  [x] No Location: left wrist/hand Pain Rating: (0-10 scale) 1/10  Pain altered Tx:  [x] No  [] Yes  Action: hot pack x10 minutes at beginning of session  Pt Comments: Pt reports she is able to more at home. She cont to report ulnar sided wrist pn. Precautions: allow patient to lift up to 10 pounds with therapy supervision but gradually increasing her weight tolerance as she has been on 1 pound weight restriction recently.  Per visit 2023    Objective:  Modalities:   Exercise Flow Sheet:  Exercise Reps/Time Weight/Level Comments Completed    Wrist flex/ext/RD/UD/ 15 1#  -   Pron/sup  Flex/ex  U/R 3x10 1lbs  X   Composite fist 10x3 AROM  -   Claw fist 10x3 AROM  -   Putty 2 mins yellow gripping X    p/u cubes and transfer 1 series   AROM  _   Power web 3x10 yellow  X   Resistive clips 1 series Red-black  -   Tendon glides 5x AROM  -   Place small pegs on board and marbles on top Full board  Not completed -   Prayer stretch 10x PROM Completed  HEP training -   Weight bearing 1 minute   X   gripper 3x10 15lbs Not

## 2023-10-06 ENCOUNTER — HOSPITAL ENCOUNTER (OUTPATIENT)
Dept: OCCUPATIONAL THERAPY | Facility: CLINIC | Age: 73
Setting detail: THERAPIES SERIES
Discharge: HOME | End: 2023-10-06
Payer: MEDICARE

## 2023-10-06 PROCEDURE — 97140 MANUAL THERAPY 1/> REGIONS: CPT

## 2023-10-06 PROCEDURE — 97110 THERAPEUTIC EXERCISES: CPT

## 2023-10-06 NOTE — FLOWSHEET NOTE
[] 1200 [facility]       Occupational Therapy            1st floor       [address], [place]         Phone: [phone]       Fax: [phone] [x] 802 [facility] Occupational Therapy  [address], [place]  Phone: [phone]  Fax: [phone]     Occupational Therapy Daily Treatment Note    Date:  10/6/2023  Patient Name:  Gustavo Velazquez    :  1950  MRN: [id number]  Referring Provider:  LAURA Heath  Insurance: Medicare  Medical Diagnosis: s52.53 left colles fracture           Rehab Codes: pain in hand M79.646,, pain in wrist M25.53,, stiffness in hand M25.64,, stiffness in wrist M25.63,, or muscle weakness generalized M62.81,  Onset Date: 2023                      Next Dr. Baker Bath: 2023  Visit# / total visits: ; Progress note for Medicare patient due at visit 10    Cancels/No Shows: 0/0      Subjective:    Pain:  [] Yes  [x] No Location: left wrist/hand Pain Rating: (0-10 scale) 1/10  Pain altered Tx:  [x] No  [] Yes  Action: hot pack x10 minutes at beginning of session  Pt Comments: Pt reports she has been using L UE as mush as she can.  Pt states following up w/ PA who cleared her for full activities w/out restrictions     Objective:  Modalities:   Exercise Flow Sheet:  Exercise Reps/Time Weight/Level Comments Completed    Wrist flex/ext/RD/UD/ 15 1#  -   Pron/sup  Flex/ex  U/R 3x10 1lbs  X   Putty 2 mins yellow gripping X    p/u cubes and transfer 1 series   AROM  -   Power web 3x10 yellow  X   Resistive clips 1 series Red-black  X   Flexbar  Wrist pronation  Wrist supination  Wrist ulnar deviation  Wrist radial dev 2x10 Red  X   Place small pegs on board and marbles on top Full board   -   Prayer stretch 10x PROM  -   Weight bearing 5x5 seconds   X   gripper 3x10 35lbs  X   Ergo Hand ex 5x2 red  -   Intrinsic stretch and hand splay 10 x ea   -   Wrist curl 2x10 Blue band  Theraband  X   Other: Access Code: [email or web address]  URL:

## 2023-10-13 ENCOUNTER — HOSPITAL ENCOUNTER (OUTPATIENT)
Dept: OCCUPATIONAL THERAPY | Facility: CLINIC | Age: 73
Setting detail: THERAPIES SERIES
Discharge: HOME | End: 2023-10-13
Payer: MEDICARE

## 2023-10-13 PROCEDURE — 97140 MANUAL THERAPY 1/> REGIONS: CPT

## 2023-10-13 PROCEDURE — 97110 THERAPEUTIC EXERCISES: CPT

## 2023-10-13 NOTE — FLOWSHEET NOTE
[] 1200 [facility]       Occupational Therapy            1st floor       [address], [place]         Phone: [phone]       Fax: [phone] [x] 808 [facility] Occupational Therapy  [address] [place] [address]  Phone: [phone]  Fax: [phone]     Occupational Therapy Daily Treatment Note    Date:  10/13/2023  Patient Name:  Grant Mares    :  1950  MRN: [id number]  Referring Provider:  Other Chauncey Brittle, PA  Insurance: Medicare  Medical Diagnosis: s52.53 left colles fracture           Rehab Codes: pain in hand M79.646,, pain in wrist M25.53,, stiffness in hand M25.64,, stiffness in wrist M25.63,, or muscle weakness generalized M62.81,  Onset Date: 2023                      Next Dr. Tiffany Benton: 2023  Visit# / total visits: ; Progress note for Medicare patient due at visit 10    Cancels/No Shows: 0/0      Subjective:    Pain:  [] Yes  [x] No Location: left wrist/hand Pain Rating: (0-10 scale) 1/10  Pain altered Tx:  [x] No  [] Yes  Action: hot pack x10 minutes at beginning of session  Pt Comments: Pt reports she is doing really well. Objective:  Modalities:   Exercise Flow Sheet:  Exercise Reps/Time Weight/Level Comments Completed    Wrist flex/ext/RD/UD/ 15 1#  -   Pron/sup  Flex/ex  U/R 3x10 2lbs  X   Putty 2 mins yellow gripping X    p/u cubes and transfer 1 series   AROM  -   Power web 3x10 yellow  X   Resistive clips 1 series Red-black  X   Flexbar  Wrist pronation  Wrist supination  Wrist ulnar deviation  Wrist radial dev 2x10 Red  X   Place small pegs on board and marbles on top Full board   -   Prayer stretch 10x PROM  -   Weight bearing 5x5 seconds   X   gripper 3x10 35lbs  X   Ergo Hand ex 5x2 red  -   Intrinsic stretch and hand splay 10 x ea   -   Wrist curl 2x10 Blue band  Theraband  -   Other: Access Code: [email or web address]  URL: [email or web address]  Date: 2023  Prepared by: Antolin Abreu      Treatment Charges: Mins Units [facility])

## 2023-10-23 ENCOUNTER — HOSPITAL ENCOUNTER (OUTPATIENT)
Dept: OCCUPATIONAL THERAPY | Facility: CLINIC | Age: 73
Setting detail: THERAPIES SERIES
Discharge: HOME | End: 2023-10-23
Payer: MEDICARE

## 2023-10-23 PROCEDURE — 97140 MANUAL THERAPY 1/> REGIONS: CPT

## 2023-10-23 PROCEDURE — 97110 THERAPEUTIC EXERCISES: CPT

## 2023-10-23 NOTE — DISCHARGE SUMMARY
[facility]  [id number]    []  Therapeutic Activity  32357  []  Vasopneumatic cold with compression  55768                []  ADL training  56489  []  Ultrasound        [id number]    []  Neuromuscular Re-education  80755  []  Electrical Stimulation Attended  [id number]    [x]  Manual Therapy  28101  Orthotic    []  Fit  48049     []  Train [id number]    []  Instruction in HEP        Prosthetic    []  Fit [id number]      []  Train [id number]    []  Cognitive Interventions, (first 15 min 65436, subsequent 15 min 98900)  []  Cold/hotpack      []  Massage   14939              Discharge Status:     [] Pt recovered from conditions. Treatment goals were met. [] Pt received maximum benefit. No further therapy indicated at this time. [x] Pt to continue exercise/home instructions independently. [] Therapy interrupted due to:    [] Pt has 2 or more no shows/cancels, is discontinued per our policy. [] Pt has completed prescribed number of treatment sessions. [] Other:     Electronically signed by: Marcelo Portillo OTR/L    If you have any questions or concerns, please don't hesitate to call.   Thank you for your referral

## 2023-10-23 NOTE — FLOWSHEET NOTE
[] 1200 [facility]       Occupational Therapy            1st floor       [address], [place]         Phone: [phone]       Fax: [phone] [x] 803 [facility] Occupational Therapy  [address] [place] [address]  Phone: [phone]  Fax: [phone]     Occupational Therapy Daily Treatment Note    Date:  10/23/2023  Patient Name:  Corinne Pryor    :  1950  MRN: [id number]  Referring Provider:  LAURA Vu  Insurance: Medicare  Medical Diagnosis: s52.53 left colles fracture           Rehab Codes: pain in hand M79.646,, pain in wrist M25.53,, stiffness in hand M25.64,, stiffness in wrist M25.63,, or muscle weakness generalized M62.81,  Onset Date: 2023                      Next Dr. Jessenia Love: 2023  Visit# / total visits: 10/20; Progress note for Medicare patient due at visit 10    Cancels/No Shows: 0/0      Subjective:    Pain:  [] Yes  [x] No Location: left wrist/hand Pain Rating: (0-10 scale) 1/10  Pain altered Tx:  [x] No  [] Yes  Action: hot pack x10 minutes at beginning of session  Pt Comments: See d/c note dated 10/23/23 for details. Objective:  Modalities:   Exercise Flow Sheet:  Exercise Reps/Time Weight/Level Comments Completed    Wrist flex/ext/RD/UD/ 15 1#  -   Pron/sup  Flex/ex  U/R 3x10 2lbs  X   Putty 2 mins yellow gripping X    p/u cubes and transfer 1 series   AROM  -   Power web 3x10 yellow  X   Resistive clips 1 series Red-black  X   Flexbar  Wrist pronation  Wrist supination  Wrist ulnar deviation  Wrist radial dev 2x10 Red  X   Place small pegs on board and marbles on top Full board   -   Prayer stretch 10x PROM  -   Weight bearing 5x5 seconds   X   gripper 3x10 35lbs  X   Ergo Hand ex 5x2 red  -   Intrinsic stretch and hand splay 10 x ea   -   Wrist curl 2x10 Blue band  Theraband  -   Other: Access Code: [email or web address]  URL: [email or web address]  Date: 2023  Prepared by: oTdd Lambert      Treatment Charges: Mins Units

## 2023-12-27 ENCOUNTER — OFFICE VISIT (OUTPATIENT)
Dept: PRIMARY CARE CLINIC | Age: 73
End: 2023-12-27
Payer: MEDICARE

## 2023-12-27 VITALS
SYSTOLIC BLOOD PRESSURE: 130 MMHG | BODY MASS INDEX: 23.82 KG/M2 | HEART RATE: 75 BPM | OXYGEN SATURATION: 100 % | DIASTOLIC BLOOD PRESSURE: 78 MMHG | WEIGHT: 138.8 LBS

## 2023-12-27 DIAGNOSIS — Z00.00 MEDICARE ANNUAL WELLNESS VISIT, SUBSEQUENT: Primary | ICD-10-CM

## 2023-12-27 DIAGNOSIS — E03.9 HYPOTHYROIDISM, UNSPECIFIED TYPE: ICD-10-CM

## 2023-12-27 DIAGNOSIS — E11.9 TYPE 2 DIABETES MELLITUS WITHOUT COMPLICATION, WITH LONG-TERM CURRENT USE OF INSULIN (HCC): ICD-10-CM

## 2023-12-27 DIAGNOSIS — E78.2 MIXED HYPERLIPIDEMIA: ICD-10-CM

## 2023-12-27 DIAGNOSIS — Z71.89 ADVANCED CARE PLANNING/COUNSELING DISCUSSION: ICD-10-CM

## 2023-12-27 DIAGNOSIS — Z79.4 TYPE 2 DIABETES MELLITUS WITHOUT COMPLICATION, WITH LONG-TERM CURRENT USE OF INSULIN (HCC): ICD-10-CM

## 2023-12-27 LAB — HBA1C MFR BLD: 7.2 %

## 2023-12-27 PROCEDURE — G8484 FLU IMMUNIZE NO ADMIN: HCPCS | Performed by: FAMILY MEDICINE

## 2023-12-27 PROCEDURE — 3051F HG A1C>EQUAL 7.0%<8.0%: CPT | Performed by: FAMILY MEDICINE

## 2023-12-27 PROCEDURE — 1123F ACP DISCUSS/DSCN MKR DOCD: CPT | Performed by: FAMILY MEDICINE

## 2023-12-27 PROCEDURE — 3017F COLORECTAL CA SCREEN DOC REV: CPT | Performed by: FAMILY MEDICINE

## 2023-12-27 PROCEDURE — G0439 PPPS, SUBSEQ VISIT: HCPCS | Performed by: FAMILY MEDICINE

## 2023-12-27 PROCEDURE — 83036 HEMOGLOBIN GLYCOSYLATED A1C: CPT | Performed by: FAMILY MEDICINE

## 2023-12-27 NOTE — PROGRESS NOTES
Medicare Annual Wellness Visit    Berny Hernandez is here for Medicare AWV and Diabetes    Assessment & Plan   Medicare annual wellness visit, subsequent  Type 2 diabetes mellitus without complication, with long-term current use of insulin ([address])  -     POCT glycosylated hemoglobin (Hb A1C)  - A1c down to 7. 2. continue current dose and healthy diet. I did recommend 3 month follow up but she would like 6 month instead so recommend to continue to monitor sugars. -     insulin detemir (LEVEMIR FLEXTOUCH) 100 UNIT/ML injection pen; Inject 12 Units into the skin nightly, Disp-5 Adjustable Dose Pre-filled Pen Syringe, R-4Normal  Mixed hyperlipidemia- continue statin  Hypothyroidism, unspecified type- continue levothyroxine. Advanced care planning/counseling discussion  -     [facility]erral to [facility] Clinical Specialist    Recommendations for Preventive Services Due: see orders and patient instructions/AVS.  Recommended screening schedule for the next 5-10 years is provided to the patient in written form: see Patient Instructions/AVS.     Return in about 6 months (around 6/27/2024) for follow up. Subjective       Patient's complete Health Risk Assessment and screening values have been reviewed and are found in Flowsheets. The following problems were reviewed today and where indicated follow up appointments were made and/or referrals ordered. Pt A1c went from 8.2 to 7.2. she is currently on 12 units levemir and uses humalog bid. States she is utd on pneumonia shot, had both shots after 67. Positive Risk Factor Screenings with Interventions:                    Vision Screen:  Do you have difficulty driving, watching TV, or doing any of your daily activities because of your eyesight?: No  Have you had an eye exam within the past year?: (!) No  No results found. Interventions:  Has appointment coming up . Follows up once a year.        Advanced Directives:  Do you have a Living Will?: (!)

## 2023-12-27 NOTE — PATIENT INSTRUCTIONS
5278-7704 mg of calcium and 1155-0322 IU of vitamin D per day. It is possible to meet your calcium requirement with diet alone, but a vitamin D supplement is usually necessary to meet this goal.  When exposed to the sun, use a sunscreen that protects against both UVA and UVB radiation with an SPF of 30 or greater. Reapply every 2 to 3 hours or after sweating, drying off with a towel, or swimming. Always wear a seat belt when traveling in a car. Always wear a helmet when riding a bicycle or motorcycle.

## 2023-12-28 ENCOUNTER — CLINICAL DOCUMENTATION (OUTPATIENT)
Dept: SPIRITUAL SERVICES | Age: 73
End: 2023-12-28

## 2023-12-28 NOTE — ACP (ADVANCE CARE PLANNING)
Advance Care Planning   Ambulatory ACP Specialist Patient Outreach    Date:  12/28/2023    ACP Specialist:  MARSHA Dangelo    Outreach call to patient in follow-up to ACP Specialist referral from:David Mazariegos DO    [x] PCP  [] Provider   [] Ambulatory Care Management [] Other     For:                  [x] Advance Directive Assistance              [] Complete Portable DNR order              [] Complete POST/POLST/MOST              [] Code Status Discussion             [] Discuss Goals of Care             [] Early ACP Decision-Making              [] Other (Specify)    Date Referral Received: 12/27/23    Next Step:   [] ACP scheduled conversation  [x] Outreach again in two weeks             [x] Email / Mail [address]  [x] Email / Mail Advance Directive   [] Closing referral.  Routing closure to referring provider/staff and to ACP Specialist . [] Closure letter mailed to patient with invitation to contact ACP Specialist if / when ready. [] Other (Specify here):         [x] At this time, Healthcare Decision Maker Is: Healthcare Decision Maker:    Primary Decision Maker: Leela Best - Spouse - [phone]         [] Primary agent named in scanned advance directive. [x] Legal Next of Kin. [] Unable to determine legal decision maker at this time. Outreaches:       [x] 1st -  Date:  12/28/23               Intervention:  [x] Spoke with Patient   [] Left Voice mail [] Email / Mail    [] [employer]  [] Other [phone] : Outcomes: ACP Specialist spoke to patient and discussed ACP referral. Patient said she has discussed completing AD documents with her . Patient confirmed that she would want her  as her Primary HCDM and also verified her 's phone number. Patient said she would like to review AD documents and provided an email address to staff of: [email or web address]    Staff will send ACP information to patient and call patient to schedule a future ACP

## 2024-01-24 ENCOUNTER — CLINICAL DOCUMENTATION (OUTPATIENT)
Dept: SPIRITUAL SERVICES | Age: 74
End: 2024-01-24

## 2024-01-24 NOTE — ACP (ADVANCE CARE PLANNING)
Decision Maker regarding differences between Advance Directives and portable DNR orders.    Length of ACP Conversation in minutes:  25    Conversation Outcomes:  ACP discussion completed    Follow-up plan:    [] Schedule follow-up conversation to continue planning  [] Referred individual to Provider for additional questions/concerns   [] Advised patient/agent/surrogate to review completed ACP document and update if needed with changes in condition, patient preferences or care setting    [x] This note routed to one or more involved healthcare providers    Summary:  Met with pt to offer ACP support. Explored understanding and offered ACP education. We reviewed HCDM and naming agent in HCPOA document. Pt shared her primary agent would be spouse and that she is considering a secondary agent. Pt plans to give this some thought and may name a nephew or close friend. We also reviewed the living will and when this is applicable. Discussion held on how to complete documents and recommended pt provided PCP office with copy. Also offered the support of completing documents with pt via docusign or offering in -person visit as needed. Pt voiced understanding of this and will reach out to SW if/when she is in need of additional support. Pt had no other questions at this time. Did send an email to pt in regards to whole body donation at her request. No other needs at this time. Thanked pt for her time today.     This referral can be closed.

## 2024-01-29 DIAGNOSIS — E11.9 TYPE 2 DIABETES MELLITUS WITHOUT COMPLICATIONS (HCC): ICD-10-CM

## 2024-01-29 RX ORDER — FLURBIPROFEN SODIUM 0.3 MG/ML
SOLUTION/ DROPS OPHTHALMIC
Qty: 200 EACH | Refills: 2 | Status: SHIPPED | OUTPATIENT
Start: 2024-01-29

## 2024-02-16 DIAGNOSIS — E11.9 TYPE 2 DIABETES MELLITUS WITHOUT COMPLICATION, WITH LONG-TERM CURRENT USE OF INSULIN (HCC): ICD-10-CM

## 2024-02-16 DIAGNOSIS — Z79.4 TYPE 2 DIABETES MELLITUS WITHOUT COMPLICATION, WITH LONG-TERM CURRENT USE OF INSULIN (HCC): ICD-10-CM

## 2024-02-16 NOTE — TELEPHONE ENCOUNTER
Last Visit Date: 12/27/2023   Next Visit Date: 6/27/2024     Patient is asking for a 90 day supply for her test strips.     Please advise, thank you!

## 2024-05-17 DIAGNOSIS — E11.9 TYPE 2 DIABETES MELLITUS WITHOUT COMPLICATION, WITH LONG-TERM CURRENT USE OF INSULIN (HCC): ICD-10-CM

## 2024-05-17 DIAGNOSIS — Z79.4 TYPE 2 DIABETES MELLITUS WITHOUT COMPLICATION, WITH LONG-TERM CURRENT USE OF INSULIN (HCC): ICD-10-CM

## 2024-05-18 RX ORDER — BLOOD SUGAR DIAGNOSTIC
STRIP MISCELLANEOUS
Qty: 300 STRIP | Refills: 1 | Status: SHIPPED | OUTPATIENT
Start: 2024-05-18

## 2024-06-27 ENCOUNTER — HOSPITAL ENCOUNTER (EMERGENCY)
Age: 74
Discharge: HOME | End: 2024-06-27
Attending: EMERGENCY MEDICINE
Payer: MEDICARE

## 2024-06-27 ENCOUNTER — TELEPHONE (OUTPATIENT)
Dept: PRIMARY CARE CLINIC | Age: 74
End: 2024-06-27

## 2024-06-27 ENCOUNTER — OFFICE VISIT (OUTPATIENT)
Dept: PRIMARY CARE CLINIC | Age: 74
End: 2024-06-27

## 2024-06-27 ENCOUNTER — HOSPITAL ENCOUNTER (OUTPATIENT)
Age: 74
Setting detail: SPECIMEN
Discharge: HOME | End: 2024-06-27

## 2024-06-27 VITALS
OXYGEN SATURATION: 100 % | HEART RATE: 92 BPM | BODY MASS INDEX: 23.34 KG/M2 | SYSTOLIC BLOOD PRESSURE: 180 MMHG | DIASTOLIC BLOOD PRESSURE: 100 MMHG | WEIGHT: 136 LBS

## 2024-06-27 VITALS
BODY MASS INDEX: 23.22 KG/M2 | HEIGHT: 64 IN | SYSTOLIC BLOOD PRESSURE: 214 MMHG | WEIGHT: 136 LBS | RESPIRATION RATE: 20 BRPM | TEMPERATURE: 97.8 F | DIASTOLIC BLOOD PRESSURE: 92 MMHG | HEART RATE: 81 BPM | OXYGEN SATURATION: 98 %

## 2024-06-27 DIAGNOSIS — E87.5 HYPERKALEMIA: Primary | ICD-10-CM

## 2024-06-27 DIAGNOSIS — I10 PRIMARY HYPERTENSION: ICD-10-CM

## 2024-06-27 DIAGNOSIS — E03.9 HYPOTHYROIDISM, UNSPECIFIED TYPE: ICD-10-CM

## 2024-06-27 DIAGNOSIS — E55.9 VITAMIN D DEFICIENCY: ICD-10-CM

## 2024-06-27 DIAGNOSIS — E11.9 TYPE 2 DIABETES MELLITUS WITHOUT COMPLICATION, UNSPECIFIED WHETHER LONG TERM INSULIN USE (HCC): ICD-10-CM

## 2024-06-27 DIAGNOSIS — I25.10 CORONARY ARTERY DISEASE INVOLVING NATIVE HEART WITHOUT ANGINA PECTORIS, UNSPECIFIED VESSEL OR LESION TYPE: ICD-10-CM

## 2024-06-27 DIAGNOSIS — E11.9 TYPE 2 DIABETES MELLITUS WITHOUT COMPLICATION, UNSPECIFIED WHETHER LONG TERM INSULIN USE (HCC): Primary | ICD-10-CM

## 2024-06-27 DIAGNOSIS — E87.8 ELECTROLYTE ABNORMALITY: Primary | ICD-10-CM

## 2024-06-27 DIAGNOSIS — E78.2 MIXED HYPERLIPIDEMIA: ICD-10-CM

## 2024-06-27 DIAGNOSIS — Z95.5 HISTORY OF PLACEMENT OF STENT IN LAD CORONARY ARTERY: ICD-10-CM

## 2024-06-27 DIAGNOSIS — I10 ELEVATED BLOOD PRESSURE READING IN OFFICE WITH DIAGNOSIS OF HYPERTENSION: ICD-10-CM

## 2024-06-27 LAB
25(OH)D3 SERPL-MCNC: 39.5 NG/ML (ref 30–100)
ALBUMIN SERPL-MCNC: 4.9 G/DL (ref 3.5–5.2)
ALBUMIN SERPL-MCNC: 5 G/DL (ref 3.5–5.2)
ALBUMIN/GLOB SERPL: 1.9 {RATIO} (ref 1–2.5)
ALBUMIN/GLOB SERPL: 2 {RATIO} (ref 1–2.5)
ALP SERPL-CCNC: 52 U/L (ref 35–104)
ALP SERPL-CCNC: 53 U/L (ref 35–104)
ALT SERPL-CCNC: 21 U/L (ref 5–33)
ALT SERPL-CCNC: 22 U/L (ref 10–35)
ANION GAP SERPL CALCULATED.3IONS-SCNC: 12 MMOL/L (ref 9–16)
ANION GAP SERPL CALCULATED.3IONS-SCNC: 12 MMOL/L (ref 9–17)
AST SERPL-CCNC: 24 U/L
AST SERPL-CCNC: 29 U/L (ref 10–35)
BASOPHILS # BLD: 0 K/UL (ref 0–0.2)
BASOPHILS # BLD: 0.05 K/UL (ref 0–0.2)
BASOPHILS NFR BLD: 1 % (ref 0–2)
BASOPHILS NFR BLD: 1 % (ref 0–2)
BILIRUB SERPL-MCNC: 1 MG/DL (ref 0.3–1.2)
BILIRUB SERPL-MCNC: 1.2 MG/DL (ref 0–1.2)
BUN SERPL-MCNC: 13 MG/DL (ref 8–23)
BUN SERPL-MCNC: 16 MG/DL (ref 8–23)
CALCIUM SERPL-MCNC: 10 MG/DL (ref 8.6–10.4)
CALCIUM SERPL-MCNC: 10.2 MG/DL (ref 8.6–10.4)
CHLORIDE SERPL-SCNC: 95 MMOL/L (ref 98–107)
CHLORIDE SERPL-SCNC: 96 MMOL/L (ref 98–107)
CHOLEST SERPL-MCNC: 238 MG/DL (ref 0–199)
CHOLESTEROL/HDL RATIO: 2
CO2 SERPL-SCNC: 24 MMOL/L (ref 20–31)
CO2 SERPL-SCNC: 26 MMOL/L (ref 20–31)
CREAT SERPL-MCNC: 0.6 MG/DL (ref 0.5–0.9)
CREAT SERPL-MCNC: 0.7 MG/DL (ref 0.5–0.9)
CREAT UR-MCNC: 34.1 MG/DL (ref 28–217)
EOSINOPHIL # BLD: 0 K/UL (ref 0–0.4)
EOSINOPHIL # BLD: 0.06 K/UL (ref 0–0.44)
EOSINOPHILS RELATIVE PERCENT: 1 % (ref 1–4)
EOSINOPHILS RELATIVE PERCENT: 1 % (ref 1–4)
ERYTHROCYTE [DISTWIDTH] IN BLOOD BY AUTOMATED COUNT: 13.7 % (ref 11.8–14.4)
ERYTHROCYTE [DISTWIDTH] IN BLOOD BY AUTOMATED COUNT: 14.1 % (ref 12.5–15.4)
GFR, ESTIMATED: >90 ML/MIN/1.73M2
GFR, ESTIMATED: >90 ML/MIN/1.73M2
GLUCOSE SERPL-MCNC: 126 MG/DL (ref 70–99)
GLUCOSE SERPL-MCNC: 133 MG/DL (ref 74–99)
HBA1C MFR BLD: 7.3 %
HCT VFR BLD AUTO: 38.3 % (ref 36–46)
HCT VFR BLD AUTO: 40.6 % (ref 36.3–47.1)
HDLC SERPL-MCNC: 134 MG/DL
HGB BLD-MCNC: 12.8 G/DL (ref 12–16)
HGB BLD-MCNC: 13.5 G/DL (ref 11.9–15.1)
IMM GRANULOCYTES # BLD AUTO: <0.03 K/UL (ref 0–0.3)
IMM GRANULOCYTES NFR BLD: 0 %
LDLC SERPL CALC-MCNC: 95 MG/DL (ref 0–100)
LYMPHOCYTES NFR BLD: 1.3 K/UL (ref 1.1–3.7)
LYMPHOCYTES NFR BLD: 1.6 K/UL (ref 1–4.8)
LYMPHOCYTES RELATIVE PERCENT: 25 % (ref 24–44)
LYMPHOCYTES RELATIVE PERCENT: 29 % (ref 24–43)
MAGNESIUM SERPL-MCNC: 2 MG/DL (ref 1.6–2.6)
MCH RBC QN AUTO: 31.6 PG (ref 26–34)
MCH RBC QN AUTO: 32 PG (ref 25.2–33.5)
MCHC RBC AUTO-ENTMCNC: 33.3 G/DL (ref 28.4–34.8)
MCHC RBC AUTO-ENTMCNC: 33.5 G/DL (ref 31–37)
MCV RBC AUTO: 94.1 FL (ref 80–100)
MCV RBC AUTO: 96.2 FL (ref 82.6–102.9)
MICROALBUMIN UR-MCNC: 27 MG/L (ref 0–20)
MICROALBUMIN/CREAT UR-RTO: 79 MCG/MG CREAT (ref 0–25)
MONOCYTES NFR BLD: 0.4 K/UL (ref 0.1–1.2)
MONOCYTES NFR BLD: 0.43 K/UL (ref 0.1–1.2)
MONOCYTES NFR BLD: 10 % (ref 3–12)
MONOCYTES NFR BLD: 7 % (ref 2–11)
NEUTROPHILS NFR BLD: 59 % (ref 36–65)
NEUTROPHILS NFR BLD: 66 % (ref 36–66)
NEUTS SEG NFR BLD: 2.62 K/UL (ref 1.5–8.1)
NEUTS SEG NFR BLD: 4.4 K/UL (ref 1.8–7.7)
NRBC BLD-RTO: 0 PER 100 WBC
PLATELET # BLD AUTO: 294 K/UL (ref 138–453)
PLATELET # BLD AUTO: 320 K/UL (ref 140–450)
PMV BLD AUTO: 10.3 FL (ref 8.1–13.5)
PMV BLD AUTO: 7.4 FL (ref 6–12)
POTASSIUM SERPL-SCNC: 4.2 MMOL/L (ref 3.7–5.3)
POTASSIUM SERPL-SCNC: 6 MMOL/L (ref 3.7–5.3)
PROT SERPL-MCNC: 7.5 G/DL (ref 6.6–8.7)
PROT SERPL-MCNC: 7.7 G/DL (ref 6.4–8.3)
RBC # BLD AUTO: 4.07 M/UL (ref 4–5.2)
RBC # BLD AUTO: 4.22 M/UL (ref 3.95–5.11)
SODIUM SERPL-SCNC: 131 MMOL/L (ref 136–145)
SODIUM SERPL-SCNC: 134 MMOL/L (ref 135–144)
TRIGL SERPL-MCNC: 43 MG/DL
TROPONIN I SERPL HS-MCNC: 10 NG/L (ref 0–14)
TSH SERPL DL<=0.05 MIU/L-ACNC: 2.07 UIU/ML (ref 0.27–4.2)
VLDLC SERPL CALC-MCNC: 9 MG/DL
WBC OTHER # BLD: 4.5 K/UL (ref 3.5–11.3)
WBC OTHER # BLD: 6.6 K/UL (ref 3.5–11)

## 2024-06-27 PROCEDURE — 36415 COLL VENOUS BLD VENIPUNCTURE: CPT

## 2024-06-27 PROCEDURE — 84484 ASSAY OF TROPONIN QUANT: CPT

## 2024-06-27 PROCEDURE — 85025 COMPLETE CBC W/AUTO DIFF WBC: CPT

## 2024-06-27 PROCEDURE — 83735 ASSAY OF MAGNESIUM: CPT

## 2024-06-27 PROCEDURE — 99283 EMERGENCY DEPT VISIT LOW MDM: CPT

## 2024-06-27 PROCEDURE — 80053 COMPREHEN METABOLIC PANEL: CPT

## 2024-06-27 RX ORDER — ATORVASTATIN CALCIUM 20 MG/1
TABLET, FILM COATED ORAL
Qty: 90 TABLET | Refills: 3 | Status: SHIPPED | OUTPATIENT
Start: 2024-06-27

## 2024-06-27 SDOH — ECONOMIC STABILITY: FOOD INSECURITY: WITHIN THE PAST 12 MONTHS, THE FOOD YOU BOUGHT JUST DIDN'T LAST AND YOU DIDN'T HAVE MONEY TO GET MORE.: NEVER TRUE

## 2024-06-27 SDOH — ECONOMIC STABILITY: FOOD INSECURITY: WITHIN THE PAST 12 MONTHS, YOU WORRIED THAT YOUR FOOD WOULD RUN OUT BEFORE YOU GOT MONEY TO BUY MORE.: NEVER TRUE

## 2024-06-27 SDOH — ECONOMIC STABILITY: INCOME INSECURITY: HOW HARD IS IT FOR YOU TO PAY FOR THE VERY BASICS LIKE FOOD, HOUSING, MEDICAL CARE, AND HEATING?: NOT HARD AT ALL

## 2024-06-27 ASSESSMENT — PATIENT HEALTH QUESTIONNAIRE - PHQ9
SUM OF ALL RESPONSES TO PHQ QUESTIONS 1-9: 0
SUM OF ALL RESPONSES TO PHQ9 QUESTIONS 1 & 2: 0
2. FEELING DOWN, DEPRESSED OR HOPELESS: NOT AT ALL
1. LITTLE INTEREST OR PLEASURE IN DOING THINGS: NOT AT ALL

## 2024-06-27 ASSESSMENT — ENCOUNTER SYMPTOMS
ABDOMINAL PAIN: 0
COUGH: 0
VOMITING: 0
BACK PAIN: 0
NAUSEA: 0
SHORTNESS OF BREATH: 0
SORE THROAT: 0
DIARRHEA: 0

## 2024-06-27 ASSESSMENT — PAIN - FUNCTIONAL ASSESSMENT: PAIN_FUNCTIONAL_ASSESSMENT: NONE - DENIES PAIN

## 2024-06-27 NOTE — DISCHARGE INSTRUCTIONS
Please continue to follow with your family care provider.    Return to the ER: Chest pain, shortness of breath or breathing difficulty, atypical headaches, weakness, vomiting; or any other concerning symptoms.

## 2024-06-27 NOTE — PROGRESS NOTES
[facility]  [facility] PRIMARY CARE  [address] [place] [address] [address]  [address]  [facility] [address]  Dept: [phone]  Dept Fax: [phone]    Venessa White is a 74 y.o. female who presents today for her medical conditions/complaints as noted below.  Venessa White is c/o of  Chief Complaint   Patient presents with    Diabetes    Health Maintenance     HCC    Discuss Labs     Order labs         HPI:     HPI    Pt here for follow up in chronic conditions.      Using levemir 12 units at night- occasionally has drops in her blood sugar.  . We discussed consider using it in the AM instead of night.   Humalog- takes with breakfast and lunch. Usually around 4-6 units.     BP very elevated but pt notes at home BP is normal. Recommend she monitor and check at home and call with BP at home. Also recommend nurse visit so we can check her BP cuff against manual in the office.          Hemoglobin A1C (%)   Date Value   06/27/2024 7.3   12/27/2023 7.2   09/25/2023 8.2             ( goal A1C is < 7)   No components found for: \"LABMICR\"  No components found for: \"LDLCHOLESTEROL\", \"LDLCALC\"    (goal LDL is <100)   AST (U/L)   Date Value   06/27/2024 24     ALT (U/L)   Date Value   06/27/2024 21     BUN (mg/dL)   Date Value   06/27/2024 16     BP Readings from Last 3 Encounters:   06/27/24 (!) 214/92   06/27/24 (!) 180/100   12/27/23 130/78          (goal 120/80)    Past Medical History:   Diagnosis Date    High cholesterol     Type 2 diabetes mellitus without complication (HCC)       Past Surgical History:   Procedure Laterality Date    CORONARY ANGIOPLASTY WITH STENT PLACEMENT      2011       Family History   Problem Relation Age of Onset    Cancer Mother     Diabetes Maternal Grandmother        Social History     Tobacco Use    Smoking status: Never    Smokeless tobacco: Never   Substance Use Topics    Alcohol use: Yes     Comment: daily wine      Current Outpatient Medications   Medication Sig Dispense Refill

## 2024-06-27 NOTE — TELEPHONE ENCOUNTER
[facility] called stating that the patient is at the lab to get her potassium checked.    Writer advised that the message was just sent to the provider and that she is out of the office for the day.  Writer advised he would try to reach the provider or one in the office.

## 2024-06-27 NOTE — ED PROVIDER NOTES
[facility] EMERGENCY DEPARTMENT  EMERGENCY DEPARTMENT ENCOUNTER      Pt Name: Venessa White  MRN: [id number]  Birthdate 1950  Date of evaluation: 6/27/2024  Provider: LUIS MANUEL Rock CNP  7:44 PM    CHIEF COMPLAINT       Chief Complaint   Patient presents with    abnormal lab result - potassium     States  routine blood work done today at [facility]; called that K+ was elevated > 6. Hx diabetes but no sx at present         HISTORY OF PRESENT ILLNESS    Venessa White is a 74 y.o. female who presents to the emergency department      This is a nontoxic-appearing 74-year-old female referred to the emergency department by her primary care physician the patient had routine 6-month blood work completed this morning ordered by her family care provider, the patient's potassium level came back at critical 6.0;, the office called her and instructed her to come to the emergency department the patient states that she has no complaints at this time no chest pain no shortness of breath dizziness or headache on the triage vital signs the patient's blood pressure is elevated she reports that she had an elevated blood pressure reading of 180/100 at her primary care office as well and she has an appointment next week to follow for recheck of this the patient has a home monitor states that when she got home from the office appointment that her blood pressure normalized to 120s over 70s.  Patient has no history of chronic kidney disease, she is a type II diabetic, states that she is not on any oral supplements.  The patient is an insulin-dependent diabetic.  Patient has no other complaints states that she would not be here with the exception that she was instructed to by her primary care physician.    The history is provided by the patient and medical records.       Nursing Notes were reviewed.    REVIEW OF SYSTEMS       Review of Systems   Constitutional:  Negative for chills, fatigue and fever.        Positive for

## 2024-06-27 NOTE — RESULT ENCOUNTER NOTE
Called pt regarding critical potassium. Pt  is feeling well however given her very elevated potassium I recommend she to to the ER for evaluation. She verbalized understanding.

## 2024-06-27 NOTE — TELEPHONE ENCOUNTER
[facility] called stating the pt was there waiting for order to recheck potassium. Writer advised caller of PCP instructions to be evaluated in the ER. Caller reported she would inform pt.

## 2024-06-27 NOTE — ED PROVIDER NOTES
[facility] Emergency Department      Pt Name: Venessa White  MRN: [id number]  Birthdate 1950  Date of evaluation: 6/27/2024    EMERGENCY DEPARTMENT ENCOUNTER           I reviewed the mid level provider's note and agree with the documented findings and we have discussed the plan of care. I have reviewed the emergency nurses triage note. I agree with the chief complaint, past medical history, past surgical history, allergies, medications, social and family history as documented unless otherwise noted below.        Katarzyna Price,   06/27/24 1926

## 2024-06-27 NOTE — TELEPHONE ENCOUNTER
It appears Dr TESFAYE saw her today and advised she go to ED for evaluation of the high potassium, NOT just to repeat the labs  I will reach out to  so that she can address next steps   Thanks

## 2024-07-08 ENCOUNTER — NURSE ONLY (OUTPATIENT)
Dept: PRIMARY CARE CLINIC | Age: 74
End: 2024-07-08

## 2024-07-08 VITALS — DIASTOLIC BLOOD PRESSURE: 80 MMHG | SYSTOLIC BLOOD PRESSURE: 130 MMHG

## 2024-07-08 NOTE — PROGRESS NOTES
Pt was in office for a BP check today as requested by PCP. Pt brought her home BP cuff & a notebook with BP readings since her last OV. Pt was roomed by NILE Sultana who took pts BP, reading was 126/80, pts cuff from home was used an a reading of 198/97 was obtained. Kayy asked writer to check pts BP as well, writer got a reading of 130/80, pts home cuff was used again and obtained a reading of 167/94. Writer made copies of pts BP log for PCP.

## 2024-08-15 ENCOUNTER — HOSPITAL ENCOUNTER (OUTPATIENT)
Dept: MAMMOGRAPHY | Age: 74
Discharge: HOME | End: 2024-08-17
Payer: MEDICARE

## 2024-08-15 VITALS — WEIGHT: 135 LBS | HEIGHT: 64 IN | BODY MASS INDEX: 23.05 KG/M2

## 2024-08-15 DIAGNOSIS — Z12.31 VISIT FOR SCREENING MAMMOGRAM: ICD-10-CM

## 2024-08-15 PROCEDURE — 77063 BREAST TOMOSYNTHESIS BI: CPT

## 2024-09-10 PROBLEM — R09.89 RIGHT CAROTID BRUIT: Status: ACTIVE | Noted: 2024-09-10

## 2024-09-10 PROBLEM — Z95.5 H/O PLACEMENT OF STENT IN ANTERIOR DESCENDING BRANCH OF LEFT CORONARY ARTERY: Status: ACTIVE | Noted: 2024-09-10

## 2024-10-02 DIAGNOSIS — E11.9 TYPE 2 DIABETES MELLITUS WITHOUT COMPLICATION, WITH LONG-TERM CURRENT USE OF INSULIN (HCC): ICD-10-CM

## 2024-10-02 DIAGNOSIS — E11.9 TYPE 2 DIABETES MELLITUS WITHOUT COMPLICATION, UNSPECIFIED WHETHER LONG TERM INSULIN USE (HCC): Primary | ICD-10-CM

## 2024-10-02 DIAGNOSIS — Z79.4 TYPE 2 DIABETES MELLITUS WITHOUT COMPLICATION, WITH LONG-TERM CURRENT USE OF INSULIN (HCC): ICD-10-CM

## 2024-10-02 RX ORDER — LANCETS
EACH MISCELLANEOUS
Qty: 100 EACH | Refills: 2 | Status: SHIPPED | OUTPATIENT
Start: 2024-10-02

## 2024-10-02 NOTE — TELEPHONE ENCOUNTER
Pt called stating she received a call from pharmacy stating they needed more information to process Rx. Writer called pharmacy and they reported they have all the information needed and will process fill. Pharmacy stated not seeing notes of calling pts.

## 2024-10-20 RX ORDER — LEVOTHYROXINE SODIUM 100 UG/1
100 TABLET ORAL DAILY
Qty: 90 TABLET | Refills: 3 | Status: SHIPPED | OUTPATIENT
Start: 2024-10-20

## 2024-11-25 DIAGNOSIS — Z79.4 TYPE 2 DIABETES MELLITUS WITHOUT COMPLICATION, WITH LONG-TERM CURRENT USE OF INSULIN (HCC): ICD-10-CM

## 2024-11-25 DIAGNOSIS — E11.9 TYPE 2 DIABETES MELLITUS WITHOUT COMPLICATION, WITH LONG-TERM CURRENT USE OF INSULIN (HCC): ICD-10-CM

## 2024-11-25 RX ORDER — INSULIN LISPRO 100 [IU]/ML
INJECTION, SOLUTION INTRAVENOUS; SUBCUTANEOUS
Qty: 15 ML | Refills: 3 | Status: SHIPPED | OUTPATIENT
Start: 2024-11-25

## 2024-12-16 DIAGNOSIS — E11.9 TYPE 2 DIABETES MELLITUS WITHOUT COMPLICATION, WITH LONG-TERM CURRENT USE OF INSULIN (HCC): ICD-10-CM

## 2024-12-16 DIAGNOSIS — Z79.4 TYPE 2 DIABETES MELLITUS WITHOUT COMPLICATION, WITH LONG-TERM CURRENT USE OF INSULIN (HCC): ICD-10-CM

## 2024-12-16 RX ORDER — BLOOD SUGAR DIAGNOSTIC
STRIP MISCELLANEOUS
Qty: 300 STRIP | Refills: 1 | Status: SHIPPED | OUTPATIENT
Start: 2024-12-16

## 2024-12-24 DIAGNOSIS — E11.9 TYPE 2 DIABETES MELLITUS WITHOUT COMPLICATIONS (HCC): ICD-10-CM

## 2024-12-24 RX ORDER — FLURBIPROFEN SODIUM 0.3 MG/ML
SOLUTION/ DROPS OPHTHALMIC
Qty: 90 EACH | Refills: 2 | Status: SHIPPED | OUTPATIENT
Start: 2024-12-24

## 2024-12-30 ENCOUNTER — TELEPHONE (OUTPATIENT)
Dept: PRIMARY CARE CLINIC | Age: 74
End: 2024-12-30

## 2024-12-30 DIAGNOSIS — E11.9 TYPE 2 DIABETES MELLITUS WITHOUT COMPLICATION, WITH LONG-TERM CURRENT USE OF INSULIN (HCC): Primary | ICD-10-CM

## 2024-12-30 DIAGNOSIS — Z79.4 TYPE 2 DIABETES MELLITUS WITHOUT COMPLICATION, WITH LONG-TERM CURRENT USE OF INSULIN (HCC): Primary | ICD-10-CM

## 2024-12-30 RX ORDER — INSULIN GLARGINE 100 [IU]/ML
12 INJECTION, SOLUTION SUBCUTANEOUS NIGHTLY
Qty: 5 ADJUSTABLE DOSE PRE-FILLED PEN SYRINGE | Refills: 4 | Status: SHIPPED | OUTPATIENT
Start: 2024-12-30

## 2024-12-30 NOTE — TELEPHONE ENCOUNTER
Pt called and said she went to get a refill of her levemir insulin from [facility] and she said they told her the manufactor no longer makes levemir and will need a different insulin. Asked Pt if they had suggestions. Pt said pharmacy told her Lantus Solostar would be the replacement. Pt is asking if that is correct and if so, to send in. Pt said she also read that it may be a different dosage change in units as well, please advise ?      Last Visit Date: 6/27/2024   Next Visit Date: 1/8/2025

## 2025-01-06 ENCOUNTER — TELEPHONE (OUTPATIENT)
Dept: PRIMARY CARE CLINIC | Age: 75
End: 2025-01-06

## 2025-01-06 NOTE — TELEPHONE ENCOUNTER
Called Pt and informed she had a new pt appt in [facility] in [facility]. Advised her if she sees a dr there she will no longer be Dr. Mazariegos Pt. Pt informed and said she is switching then. She rescheduled the appointment with Yair Julian for tomorrow to get meds until then.

## 2025-01-06 NOTE — TELEPHONE ENCOUNTER
----- Message from Jay Jay BARKLEY sent at 1/6/2025 10:04 AM EST -----  Regarding: ECC Appointment Request  ECC Appointment Request    Patient needs appointment for ECC Appointment Type: Existing Condition Follow Up.    Patient Requested Dates(s): February 10, 2025  Patient Requested Time: preferred morning  Provider Name:CLOVIS BENAVIDES [[id number]]    Reason for Appointment Request: Established Patient - No appointments available during search  --------------------------------------------------------------------------------------------------------------------------    Relationship to Patient: Self     Call Back Information: OK to leave message on voicemail  Preferred Call Back Number: Phone [phone]

## 2025-03-08 PROBLEM — Z78.9 NON-SMOKER: Status: ACTIVE | Noted: 2025-03-08

## 2025-03-08 PROBLEM — Z78.9 NON-SMOKER: Chronic | Status: ACTIVE | Noted: 2025-03-08

## 2025-03-08 NOTE — PROGRESS NOTES
[facility]  [address] [place] [address]  Phone: [phone]  Fax: [phone]      Date of Visit:  3/10/2025  Patient Name: Venessa White   Patient :  1950     Assessment/Plan:     1. Type 2 diabetes mellitus without complication, with long-term current use of insulin (HCC)  Comments:  Follow-up with Dr. Garcia  2. Hypothyroidism, unspecified type  Comments:  Continue current management  3. Non-smoker  4. Cerumen in auditory canal on examination  Comments:  Left-over-the-counter medication as directed.       Plan:               Patient Instructions   Great to meet you today.  As we discussed I think you are maximizing your lifestyle changes with diet and exercise.  Your biggest risk factor is elevated blood sugar which we we will work on and have you meet with Dr. Garcia.    For your earwax continue with the over-the-counter medication as directed.  When you irrigate the ear canal use water that is tepid (not too hot or cold.  No need to irrigate any closer than every 6 weeks.  I will recheck your ears the summer.    No changes in medications at the moment.  Medicare annual wellness this summer and we will order a DEXA scan at that time.    Laboratory services   -available Mon-Friday 7:30AM-4PM Daily with a Lunch break 12:30-1PM Daily.      Patient Education       A Healthy Lifestyle: Care Instructions  A healthy lifestyle can help you feel good, have more energy, and stay at a weight that's healthy for you. You can share a healthy lifestyle with your friends and family. And you can do it on your own.    Eat meals with your friends or family. You could try cooking together.   Plan activities with other people. Go for a walk with a friend, try a free online fitness class, or join a sports league.     Eat a variety of healthy foods. These include fruits, vegetables, whole grains, low-fat dairy, and lean protein.   Choose healthy

## 2025-03-10 ENCOUNTER — OFFICE VISIT (OUTPATIENT)
Age: 75
End: 2025-03-10
Payer: MEDICARE

## 2025-03-10 VITALS
SYSTOLIC BLOOD PRESSURE: 179 MMHG | HEIGHT: 64 IN | DIASTOLIC BLOOD PRESSURE: 71 MMHG | RESPIRATION RATE: 16 BRPM | HEART RATE: 87 BPM | BODY MASS INDEX: 23.56 KG/M2 | TEMPERATURE: 98.2 F | WEIGHT: 138 LBS

## 2025-03-10 DIAGNOSIS — H61.20 CERUMEN IN AUDITORY CANAL ON EXAMINATION: ICD-10-CM

## 2025-03-10 DIAGNOSIS — E03.9 HYPOTHYROIDISM, UNSPECIFIED TYPE: ICD-10-CM

## 2025-03-10 DIAGNOSIS — E11.9 TYPE 2 DIABETES MELLITUS WITHOUT COMPLICATION, WITH LONG-TERM CURRENT USE OF INSULIN (HCC): Primary | ICD-10-CM

## 2025-03-10 DIAGNOSIS — Z78.9 NON-SMOKER: ICD-10-CM

## 2025-03-10 DIAGNOSIS — Z79.4 TYPE 2 DIABETES MELLITUS WITHOUT COMPLICATION, WITH LONG-TERM CURRENT USE OF INSULIN (HCC): Primary | ICD-10-CM

## 2025-03-10 PROCEDURE — 99202 OFFICE O/P NEW SF 15 MIN: CPT | Performed by: FAMILY MEDICINE

## 2025-03-10 PROCEDURE — 99214 OFFICE O/P EST MOD 30 MIN: CPT | Performed by: FAMILY MEDICINE

## 2025-03-10 SDOH — ECONOMIC STABILITY: FOOD INSECURITY: WITHIN THE PAST 12 MONTHS, THE FOOD YOU BOUGHT JUST DIDN'T LAST AND YOU DIDN'T HAVE MONEY TO GET MORE.: NEVER TRUE

## 2025-03-10 SDOH — ECONOMIC STABILITY: FOOD INSECURITY: WITHIN THE PAST 12 MONTHS, YOU WORRIED THAT YOUR FOOD WOULD RUN OUT BEFORE YOU GOT MONEY TO BUY MORE.: NEVER TRUE

## 2025-03-10 ASSESSMENT — PATIENT HEALTH QUESTIONNAIRE - PHQ9
SUM OF ALL RESPONSES TO PHQ QUESTIONS 1-9: 0
2. FEELING DOWN, DEPRESSED OR HOPELESS: NOT AT ALL
1. LITTLE INTEREST OR PLEASURE IN DOING THINGS: NOT AT ALL
SUM OF ALL RESPONSES TO PHQ QUESTIONS 1-9: 0

## 2025-03-10 ASSESSMENT — ENCOUNTER SYMPTOMS
GASTROINTESTINAL NEGATIVE: 1
RESPIRATORY NEGATIVE: 1

## 2025-03-10 NOTE — PATIENT INSTRUCTIONS
Great to meet you today.  As we discussed I think you are maximizing your lifestyle changes with diet and exercise.  Your biggest risk factor is elevated blood sugar which we we will work on and have you meet with Dr. Garcia.    For your earwax continue with the over-the-counter medication as directed.  When you irrigate the ear canal use water that is tepid (not too hot or cold.  No need to irrigate any closer than every 6 weeks.  I will recheck your ears the summer.    No changes in medications at the moment.  Medicare annual wellness this summer and we will order a DEXA scan at that time.    Laboratory services   -available Mon-Friday 7:30AM-4PM Daily with a Lunch break 12:30-1PM Daily.      Patient Education        A Healthy Lifestyle: Care Instructions  A healthy lifestyle can help you feel good, have more energy, and stay at a weight that's healthy for you. You can share a healthy lifestyle with your friends and family. And you can do it on your own.    Eat meals with your friends or family. You could try cooking together.   Plan activities with other people. Go for a walk with a friend, try a free online fitness class, or join a sports league.     Eat a variety of healthy foods. These include fruits, vegetables, whole grains, low-fat dairy, and lean protein.   Choose healthy portions of food. You can use the Nutrition Facts label on food packages as a guide.     Eat more fruits and vegetables. You could add vegetables to sandwiches or add fruit to cereal.   Drink water when you are thirsty. Limit soda, juice, and sports drinks.     Try to exercise most days. Aim for at least 2½ hours of exercise each week.   Keep moving. Work in the garden or take your dog on a walk. Use the stairs instead of the elevator.     If you use tobacco or nicotine, try to quit. Ask your doctor about programs and medicines to help you quit.   Limit alcohol. Men should have no more than 2 drinks a day. Women should have no more than

## 2025-03-11 ENCOUNTER — TELEPHONE (OUTPATIENT)
Age: 75
End: 2025-03-11

## 2025-03-11 DIAGNOSIS — Z79.4 TYPE 2 DIABETES MELLITUS WITHOUT COMPLICATION, WITH LONG-TERM CURRENT USE OF INSULIN (HCC): Primary | ICD-10-CM

## 2025-03-11 DIAGNOSIS — E11.9 TYPE 2 DIABETES MELLITUS WITHOUT COMPLICATION, WITH LONG-TERM CURRENT USE OF INSULIN (HCC): Primary | ICD-10-CM

## 2025-03-11 NOTE — TELEPHONE ENCOUNTER
I spoke with Dr. Garcia concerning this patient.  She was seen by an endocrinologist about 15 years ago and at that time she was taken off the metformin.  She was told that she had adult onset diabetes and was placed on insulin.    She was told by an internist that she was neither type I or type II.    Other pertinent history she is also hypothyroid.  Etiology is not known but I am assuming it is acquired.  Again this could be a connective tissue etiology    Therefore we are ordering labs today including an A1c as well as a C-peptide, isaias 65, IA 2

## 2025-03-12 ENCOUNTER — HOSPITAL ENCOUNTER (OUTPATIENT)
Age: 75
Setting detail: SPECIMEN
Discharge: HOME | End: 2025-03-12

## 2025-03-12 DIAGNOSIS — E11.9 TYPE 2 DIABETES MELLITUS WITHOUT COMPLICATION, WITH LONG-TERM CURRENT USE OF INSULIN (HCC): ICD-10-CM

## 2025-03-12 DIAGNOSIS — Z79.4 TYPE 2 DIABETES MELLITUS WITHOUT COMPLICATION, WITH LONG-TERM CURRENT USE OF INSULIN (HCC): ICD-10-CM

## 2025-03-12 LAB
C PEPTIDE SERPL-MCNC: 0.4 NG/ML (ref 1.1–4.4)
EST. AVERAGE GLUCOSE BLD GHB EST-MCNC: 160 MG/DL
HBA1C MFR BLD: 7.2 % (ref 4–6)

## 2025-03-14 DIAGNOSIS — E13.9 LADA (LATENT AUTOIMMUNE DIABETES OF ADULTHOOD), MANAGED AS TYPE 1 (HCC): Primary | ICD-10-CM

## 2025-03-14 LAB — GLUTAMIC ACID DECARB AB: >250 IU/ML (ref 0–5)

## 2025-03-16 LAB — ISLET CELL512 AB SER IA-ACNC: <5.4 U/ML (ref 0–7.4)

## 2025-04-08 NOTE — PROGRESS NOTES
agreement with the pharmacist as referred by his/her physician.     LE SOLARES, [facility]    [place], PharmD Candidate was presents for the visit and participated in the encounter.     ==================================================================    For Pharmacy Admin Tracking Only    Program: Medical Group  CPA in place:  Yes  Recommendation Provided To: Patient/Caregiver: 1 via In person  Intervention Detail: Device Training  Intervention Accepted By: Patient/Caregiver: 1  Gap Closed?: No   Time Spent (min): 60

## 2025-04-09 ENCOUNTER — OFFICE VISIT (OUTPATIENT)
Age: 75
End: 2025-04-09
Payer: MEDICARE

## 2025-04-09 VITALS — WEIGHT: 139.6 LBS | BODY MASS INDEX: 23.96 KG/M2

## 2025-04-09 DIAGNOSIS — E13.9 LADA (LATENT AUTOIMMUNE DIABETES OF ADULTHOOD), MANAGED AS TYPE 1 (HCC): Primary | ICD-10-CM

## 2025-04-09 PROCEDURE — 99211 OFF/OP EST MAY X REQ PHY/QHP: CPT | Performed by: PHARMACIST

## 2025-04-09 PROCEDURE — 3051F HG A1C>EQUAL 7.0%<8.0%: CPT | Performed by: FAMILY MEDICINE

## 2025-04-09 PROCEDURE — G8427 DOCREV CUR MEDS BY ELIG CLIN: HCPCS | Performed by: FAMILY MEDICINE

## 2025-04-09 PROCEDURE — G8420 CALC BMI NORM PARAMETERS: HCPCS | Performed by: FAMILY MEDICINE

## 2025-04-09 NOTE — PATIENT INSTRUCTIONS
Thank you for coming in today and allowing me to be part of your care team.    Google Freestyle Rachna application video to see the application again if needed.   Look into [email or web address]  Freestyle Rachna Instructions:  Please place the sensor on the fatty portion of the back of your arm.  Please alternate arms, or at least sites, every time you place a new one.  These will be replaced every 15 days and your su/reader will let you know how many days you have left.  Please do not submerge these underwater for more than 30 minutes.  You can buy patch covers online for these.  There is a 10-15 minute \"lag\" in the reading on your Freestyle Rachna from what a fingerstick would result.  If you suspect that the reading dose not match how you are feeling, please do a fingerstick to check.  Please save the lot number for each new sensor until you throw it away.  If it malfunctions, you can call the company with that information and they will send you a new one.  Please keep the reader or your cell phone within 20 feet of you so that the signal can be reached to your sensor.   You will likely get a call from [employer] or [employer] about the Freestyle Rachna.     If you have any questions or concerns, please always feel free to call the office at [phone] and ask for Audrey the Pharmacist.

## 2025-04-23 ENCOUNTER — TELEPHONE (OUTPATIENT)
Age: 75
End: 2025-04-23

## 2025-06-04 DIAGNOSIS — Z79.4 TYPE 2 DIABETES MELLITUS WITHOUT COMPLICATION, WITH LONG-TERM CURRENT USE OF INSULIN (HCC): ICD-10-CM

## 2025-06-04 DIAGNOSIS — E11.9 TYPE 2 DIABETES MELLITUS WITHOUT COMPLICATIONS (HCC): ICD-10-CM

## 2025-06-04 DIAGNOSIS — E11.9 TYPE 2 DIABETES MELLITUS WITHOUT COMPLICATION, WITH LONG-TERM CURRENT USE OF INSULIN (HCC): ICD-10-CM

## 2025-06-04 RX ORDER — BLOOD SUGAR DIAGNOSTIC
1 STRIP MISCELLANEOUS 2 TIMES DAILY
Qty: 300 STRIP | Refills: 1 | Status: SHIPPED | OUTPATIENT
Start: 2025-06-04

## 2025-06-04 RX ORDER — FLURBIPROFEN SODIUM 0.3 MG/ML
SOLUTION/ DROPS OPHTHALMIC
Qty: 200 EACH | Refills: 2 | Status: SHIPPED | OUTPATIENT
Start: 2025-06-04

## 2025-07-11 NOTE — PROGRESS NOTES
Medicare Service Recommended Frequency Last Done Due next   Pneumonia vaccine After 65, Prevnar 20 ONCE Due for Prevnar 20 RECEIVED   Influenza vaccine Yearly REFUSED REFUSED   Zoster/Shingles Shingrix vaccine:  2 shots 2-6 months apart  12/4/2020 DONE   COVID Variable Initial series Eligible for booster   Tetanus vaccine (Td or Tdap) Every 10 years  Previous history DO AT PHARMACY   RSV vaccine One injection Not done Eligible for immunization at pharmacy   Mammogram Every 1-2 years (ages ~40-75) Done 8/15/2024 Eligible in August   Colorectal Cancer Screening FIT test yearly, Cologuard every 3 years, Colonoscopy every 10 years *unless otherwise indicated* 2016 DUE IN 2026   Cardiovascular/cholesterol screening As determined by your physician 6/27/2024  Total cholesterol: 238  Triglycerides: 43  HDL (good cholesterol): 134  LDL (bad cholesterol): 95 As determined by your physician   Diabetes screening   As determined by your physician 3/12/2025  A1c/glucose: 7.2 DO TODAY   Osteoporosis screening   DEXA every 2 years for females (ages 65-85) 10/1/2020 osteopenia bilateral hips Due now   Screening for abdominal aortic aneurysm One-time screening in patients if you have a family history of abdominal aortic aneurysms, or you're a man 65-75 and have smoked at least 100 cigarettes in your lifetime Nonapplicable Not applicable   Low Dose CT/Lung Cancer  Annual ages 50 to 77 (80) years who have a 20 pack-year smoking history and currently smoke or have quit within the past 15 years Nonapplicable Not applicable   Glaucoma screening       Covered yearly for those:  with diabetes mellitus  with a family history of glaucoma  -Americans aged 50 and older  -Americans aged 65 and older 5/9/2024 DONE IN MAY   HIV/Hepatitis C/STI testing STI/HIV:  Annually as necessary  Hepatitis C:  Once in a lifetime unless high risk behavior 8/15/2023 Not applicable     Patient seen by myself originally on 3/10/2025 for diabetes as

## 2025-07-14 ENCOUNTER — OFFICE VISIT (OUTPATIENT)
Age: 75
End: 2025-07-14
Payer: MEDICARE

## 2025-07-14 VITALS
SYSTOLIC BLOOD PRESSURE: 146 MMHG | WEIGHT: 138.5 LBS | HEART RATE: 79 BPM | HEIGHT: 63 IN | TEMPERATURE: 97.7 F | BODY MASS INDEX: 24.54 KG/M2 | DIASTOLIC BLOOD PRESSURE: 61 MMHG | RESPIRATION RATE: 16 BRPM

## 2025-07-14 DIAGNOSIS — E78.2 MIXED HYPERLIPIDEMIA: ICD-10-CM

## 2025-07-14 DIAGNOSIS — Z00.00 MEDICARE ANNUAL WELLNESS VISIT, SUBSEQUENT: Primary | ICD-10-CM

## 2025-07-14 DIAGNOSIS — Z12.31 SCREENING MAMMOGRAM FOR BREAST CANCER: ICD-10-CM

## 2025-07-14 DIAGNOSIS — E03.9 HYPOTHYROIDISM, UNSPECIFIED TYPE: ICD-10-CM

## 2025-07-14 DIAGNOSIS — Z78.9 NON-SMOKER: ICD-10-CM

## 2025-07-14 DIAGNOSIS — I11.9 HYPERTENSIVE HEART DISEASE WITHOUT HEART FAILURE: ICD-10-CM

## 2025-07-14 DIAGNOSIS — Z78.0 POST-MENOPAUSAL: ICD-10-CM

## 2025-07-14 DIAGNOSIS — E13.9 LADA (LATENT AUTOIMMUNE DIABETES OF ADULTHOOD), MANAGED AS TYPE 1 (HCC): ICD-10-CM

## 2025-07-14 PROCEDURE — 1159F MED LIST DOCD IN RCRD: CPT | Performed by: FAMILY MEDICINE

## 2025-07-14 PROCEDURE — 1123F ACP DISCUSS/DSCN MKR DOCD: CPT | Performed by: FAMILY MEDICINE

## 2025-07-14 PROCEDURE — G0439 PPPS, SUBSEQ VISIT: HCPCS | Performed by: FAMILY MEDICINE

## 2025-07-14 PROCEDURE — 3051F HG A1C>EQUAL 7.0%<8.0%: CPT | Performed by: FAMILY MEDICINE

## 2025-07-14 PROCEDURE — 1126F AMNT PAIN NOTED NONE PRSNT: CPT | Performed by: FAMILY MEDICINE

## 2025-07-14 PROCEDURE — 3017F COLORECTAL CA SCREEN DOC REV: CPT | Performed by: FAMILY MEDICINE

## 2025-07-14 PROCEDURE — 1160F RVW MEDS BY RX/DR IN RCRD: CPT | Performed by: FAMILY MEDICINE

## 2025-07-14 RX ORDER — ATORVASTATIN CALCIUM 20 MG/1
TABLET, FILM COATED ORAL
Qty: 90 TABLET | Refills: 3 | Status: SHIPPED
Start: 2025-07-14

## 2025-07-14 RX ORDER — VALSARTAN 40 MG/1
40 TABLET ORAL DAILY
Qty: 30 TABLET | Refills: 3 | Status: CANCELLED | OUTPATIENT
Start: 2025-07-14

## 2025-07-14 ASSESSMENT — LIFESTYLE VARIABLES
HOW OFTEN DURING THE LAST YEAR HAVE YOU NEEDED AN ALCOHOLIC DRINK FIRST THING IN THE MORNING TO GET YOURSELF GOING AFTER A NIGHT OF HEAVY DRINKING: NEVER
HAVE YOU OR SOMEONE ELSE BEEN INJURED AS A RESULT OF YOUR DRINKING: NO
HOW OFTEN DURING THE LAST YEAR HAVE YOU BEEN UNABLE TO REMEMBER WHAT HAPPENED THE NIGHT BEFORE BECAUSE YOU HAD BEEN DRINKING: NEVER
HOW MANY STANDARD DRINKS CONTAINING ALCOHOL DO YOU HAVE ON A TYPICAL DAY: 1 OR 2
HOW OFTEN DURING THE LAST YEAR HAVE YOU FAILED TO DO WHAT WAS NORMALLY EXPECTED FROM YOU BECAUSE OF DRINKING: NEVER
HOW OFTEN DO YOU HAVE A DRINK CONTAINING ALCOHOL: 4 OR MORE TIMES A WEEK
HOW OFTEN DURING THE LAST YEAR HAVE YOU FOUND THAT YOU WERE NOT ABLE TO STOP DRINKING ONCE YOU HAD STARTED: NEVER
HAS A RELATIVE, FRIEND, DOCTOR, OR ANOTHER HEALTH PROFESSIONAL EXPRESSED CONCERN ABOUT YOUR DRINKING OR SUGGESTED YOU CUT DOWN: NO
HOW OFTEN DURING THE LAST YEAR HAVE YOU HAD A FEELING OF GUILT OR REMORSE AFTER DRINKING: NEVER

## 2025-07-14 ASSESSMENT — PATIENT HEALTH QUESTIONNAIRE - PHQ9
SUM OF ALL RESPONSES TO PHQ QUESTIONS 1-9: 0
SUM OF ALL RESPONSES TO PHQ QUESTIONS 1-9: 0
1. LITTLE INTEREST OR PLEASURE IN DOING THINGS: NOT AT ALL
SUM OF ALL RESPONSES TO PHQ QUESTIONS 1-9: 0
SUM OF ALL RESPONSES TO PHQ QUESTIONS 1-9: 0
2. FEELING DOWN, DEPRESSED OR HOPELESS: NOT AT ALL

## 2025-07-14 NOTE — PATIENT INSTRUCTIONS
Great to see you today.  Below is the Medicare annual wellness chart we put together:  Medicare Service Recommended Frequency Last Done Due next   Pneumonia vaccine After 65, Prevnar 20 ONCE Due for Prevnar 20 RECEIVED   Influenza vaccine Yearly REFUSED REFUSED   Zoster/Shingles Shingrix vaccine:  2 shots 2-6 months apart  12/4/2020 DONE   COVID Variable Initial series Eligible for booster   Tetanus vaccine (Td or Tdap) Every 10 years  Previous history DO AT PHARMACY   RSV vaccine One injection Not done Eligible for immunization at pharmacy   Mammogram Every 1-2 years (ages ~40-75) Done 8/15/2024 Eligible in August   Colorectal Cancer Screening FIT test yearly, Cologuard every 3 years, Colonoscopy every 10 years *unless otherwise indicated* 2016 DUE IN 2026   Cardiovascular/cholesterol screening As determined by your physician 6/27/2024  Total cholesterol: 238  Triglycerides: 43  HDL (good cholesterol): 134  LDL (bad cholesterol): 95 As determined by your physician   Diabetes screening    As determined by your physician 3/12/2025  A1c/glucose: 7.2 DO TODAY   Osteoporosis screening    DEXA every 2 years for females (ages 65-85) 10/1/2020 osteopenia bilateral hips Due now   Screening for abdominal aortic aneurysm One-time screening in patients if you have a family history of abdominal aortic aneurysms, or you're a man 65-75 and have smoked at least 100 cigarettes in your lifetime Nonapplicable Not applicable   Low Dose CT/Lung Cancer  Annual ages 50 to 77 (80) years who have a 20 pack-year smoking history and currently smoke or have quit within the past 15 years Nonapplicable Not applicable   Glaucoma screening          Covered yearly for those:  with diabetes mellitus  with a family history of glaucoma  -Americans aged 50 and older  -Americans aged 65 and older 5/9/2024 DONE IN MAY   HIV/Hepatitis C/STI testing STI/HIV:  Annually as necessary  Hepatitis C:  Once in a lifetime unless high risk behavior

## 2025-07-16 ENCOUNTER — HOSPITAL ENCOUNTER (OUTPATIENT)
Age: 75
Setting detail: SPECIMEN
Discharge: HOME | End: 2025-07-16

## 2025-07-16 DIAGNOSIS — E13.9 LADA (LATENT AUTOIMMUNE DIABETES OF ADULTHOOD), MANAGED AS TYPE 1 (HCC): ICD-10-CM

## 2025-07-16 DIAGNOSIS — I11.9 HYPERTENSIVE HEART DISEASE WITHOUT HEART FAILURE: ICD-10-CM

## 2025-07-16 DIAGNOSIS — E78.2 MIXED HYPERLIPIDEMIA: ICD-10-CM

## 2025-07-16 DIAGNOSIS — E03.9 HYPOTHYROIDISM, UNSPECIFIED TYPE: ICD-10-CM

## 2025-07-16 LAB
ALBUMIN SERPL-MCNC: 4.6 G/DL (ref 3.5–5.2)
ALBUMIN/GLOB SERPL: 2 {RATIO} (ref 1–2.5)
ALP SERPL-CCNC: 49 U/L (ref 35–104)
ALT SERPL-CCNC: 35 U/L (ref 10–35)
ANION GAP SERPL CALCULATED.3IONS-SCNC: 11 MMOL/L (ref 9–16)
AST SERPL-CCNC: 31 U/L (ref 10–35)
BASOPHILS # BLD: 0.03 K/UL (ref 0–0.2)
BASOPHILS NFR BLD: 1 % (ref 0–2)
BILIRUB SERPL-MCNC: 0.9 MG/DL (ref 0–1.2)
BUN SERPL-MCNC: 17 MG/DL (ref 8–23)
CALCIUM SERPL-MCNC: 10 MG/DL (ref 8.6–10.4)
CHLORIDE SERPL-SCNC: 99 MMOL/L (ref 98–107)
CHOLEST SERPL-MCNC: 218 MG/DL (ref 0–199)
CHOLESTEROL/HDL RATIO: 1.8
CO2 SERPL-SCNC: 26 MMOL/L (ref 20–31)
CREAT SERPL-MCNC: 0.6 MG/DL (ref 0.6–0.9)
CREAT UR-MCNC: 67.6 MG/DL (ref 28–217)
EOSINOPHIL # BLD: 0.14 K/UL (ref 0–0.44)
EOSINOPHILS RELATIVE PERCENT: 3 % (ref 1–4)
ERYTHROCYTE [DISTWIDTH] IN BLOOD BY AUTOMATED COUNT: 13.6 % (ref 11.8–14.4)
EST. AVERAGE GLUCOSE BLD GHB EST-MCNC: 148 MG/DL
GFR, ESTIMATED: >90 ML/MIN/1.73M2
GLUCOSE SERPL-MCNC: 146 MG/DL (ref 74–99)
HBA1C MFR BLD: 6.8 % (ref 4–6)
HCT VFR BLD AUTO: 38.4 % (ref 36.3–47.1)
HDLC SERPL-MCNC: 122 MG/DL
HGB BLD-MCNC: 12.5 G/DL (ref 11.9–15.1)
IMM GRANULOCYTES # BLD AUTO: <0.03 K/UL (ref 0–0.3)
IMM GRANULOCYTES NFR BLD: 0 %
LDLC SERPL CALC-MCNC: 88 MG/DL (ref 0–100)
LYMPHOCYTES NFR BLD: 1.32 K/UL (ref 1.1–3.7)
LYMPHOCYTES RELATIVE PERCENT: 32 % (ref 24–43)
MCH RBC QN AUTO: 31.6 PG (ref 25.2–33.5)
MCHC RBC AUTO-ENTMCNC: 32.6 G/DL (ref 28.4–34.8)
MCV RBC AUTO: 97.2 FL (ref 82.6–102.9)
MICROALBUMIN UR-MCNC: 12 MG/L (ref 0–20)
MICROALBUMIN/CREAT UR-RTO: 18 MCG/MG CREAT (ref 0–25)
MONOCYTES NFR BLD: 0.44 K/UL (ref 0.1–1.2)
MONOCYTES NFR BLD: 11 % (ref 3–12)
NEUTROPHILS NFR BLD: 53 % (ref 36–65)
NEUTS SEG NFR BLD: 2.17 K/UL (ref 1.5–8.1)
NRBC BLD-RTO: 0 PER 100 WBC
PLATELET # BLD AUTO: 279 K/UL (ref 138–453)
PMV BLD AUTO: 9.9 FL (ref 8.1–13.5)
POTASSIUM SERPL-SCNC: 4.8 MMOL/L (ref 3.7–5.3)
PROT SERPL-MCNC: 6.9 G/DL (ref 6.6–8.7)
RBC # BLD AUTO: 3.95 M/UL (ref 3.95–5.11)
SODIUM SERPL-SCNC: 136 MMOL/L (ref 136–145)
TRIGL SERPL-MCNC: 40 MG/DL
TSH SERPL DL<=0.05 MIU/L-ACNC: 1.19 UIU/ML (ref 0.27–4.2)
VLDLC SERPL CALC-MCNC: 8 MG/DL (ref 1–30)
WBC OTHER # BLD: 4.1 K/UL (ref 3.5–11.3)

## 2025-07-17 ENCOUNTER — RESULTS FOLLOW-UP (OUTPATIENT)
Age: 75
End: 2025-07-17

## 2025-08-20 ENCOUNTER — OFFICE VISIT (OUTPATIENT)
Age: 75
End: 2025-08-20
Payer: MEDICARE

## 2025-08-20 VITALS
SYSTOLIC BLOOD PRESSURE: 154 MMHG | BODY MASS INDEX: 24.8 KG/M2 | HEIGHT: 63 IN | HEART RATE: 79 BPM | DIASTOLIC BLOOD PRESSURE: 69 MMHG | TEMPERATURE: 98.4 F | WEIGHT: 140 LBS | RESPIRATION RATE: 26 BRPM

## 2025-08-20 DIAGNOSIS — H66.003 NON-RECURRENT ACUTE SUPPURATIVE OTITIS MEDIA OF BOTH EARS WITHOUT SPONTANEOUS RUPTURE OF TYMPANIC MEMBRANES: Primary | ICD-10-CM

## 2025-08-20 DIAGNOSIS — J06.9 VIRAL URI: ICD-10-CM

## 2025-08-20 DIAGNOSIS — Z78.9 NON-SMOKER: Chronic | ICD-10-CM

## 2025-08-20 PROCEDURE — 1036F TOBACCO NON-USER: CPT | Performed by: FAMILY MEDICINE

## 2025-08-20 PROCEDURE — 1090F PRES/ABSN URINE INCON ASSESS: CPT | Performed by: FAMILY MEDICINE

## 2025-08-20 PROCEDURE — G8428 CUR MEDS NOT DOCUMENT: HCPCS | Performed by: FAMILY MEDICINE

## 2025-08-20 PROCEDURE — 99212 OFFICE O/P EST SF 10 MIN: CPT | Performed by: FAMILY MEDICINE

## 2025-08-20 PROCEDURE — 99213 OFFICE O/P EST LOW 20 MIN: CPT | Performed by: FAMILY MEDICINE

## 2025-08-20 PROCEDURE — 1123F ACP DISCUSS/DSCN MKR DOCD: CPT | Performed by: FAMILY MEDICINE

## 2025-08-20 PROCEDURE — G8419 CALC BMI OUT NRM PARAM NOF/U: HCPCS | Performed by: FAMILY MEDICINE

## 2025-08-20 PROCEDURE — 3017F COLORECTAL CA SCREEN DOC REV: CPT | Performed by: FAMILY MEDICINE

## 2025-08-20 PROCEDURE — G8400 PT W/DXA NO RESULTS DOC: HCPCS | Performed by: FAMILY MEDICINE
